# Patient Record
Sex: MALE | Race: WHITE | NOT HISPANIC OR LATINO
[De-identification: names, ages, dates, MRNs, and addresses within clinical notes are randomized per-mention and may not be internally consistent; named-entity substitution may affect disease eponyms.]

---

## 2017-01-07 ENCOUNTER — APPOINTMENT (OUTPATIENT)
Dept: OTOLARYNGOLOGY | Facility: CLINIC | Age: 67
End: 2017-01-07

## 2017-01-07 VITALS
WEIGHT: 160 LBS | HEIGHT: 64 IN | BODY MASS INDEX: 27.31 KG/M2 | HEART RATE: 69 BPM | DIASTOLIC BLOOD PRESSURE: 84 MMHG | SYSTOLIC BLOOD PRESSURE: 128 MMHG

## 2017-01-07 DIAGNOSIS — R90.89 OTHER ABNORMAL FINDINGS ON DIAGNOSTIC IMAGING OF CENTRAL NERVOUS SYSTEM: ICD-10-CM

## 2017-01-07 DIAGNOSIS — H90.41 SENSORINEURAL HEARING LOSS, UNILATERAL, RIGHT EAR, WITH UNRESTRICTED HEARING ON THE CONTRALATERAL SIDE: ICD-10-CM

## 2017-01-08 PROBLEM — R90.89 ABNORMAL BRAIN MRI: Status: ACTIVE | Noted: 2017-01-08

## 2017-01-08 PROBLEM — H90.41 RIGHT ASYMMETRICAL SNHL: Status: ACTIVE | Noted: 2017-01-08

## 2017-01-08 RX ORDER — METOPROLOL SUCCINATE 25 MG/1
25 TABLET, EXTENDED RELEASE ORAL
Qty: 90 | Refills: 0 | Status: ACTIVE | COMMUNITY
Start: 2015-12-07

## 2017-01-08 RX ORDER — ZONISAMIDE 100 MG/1
100 CAPSULE ORAL
Qty: 90 | Refills: 0 | Status: ACTIVE | COMMUNITY
Start: 2016-08-11

## 2017-01-08 RX ORDER — CLOPIDOGREL BISULFATE 75 MG/1
75 TABLET, FILM COATED ORAL
Qty: 90 | Refills: 0 | Status: ACTIVE | COMMUNITY
Start: 2015-11-20

## 2017-01-08 RX ORDER — ROSUVASTATIN CALCIUM 5 MG/1
5 TABLET, FILM COATED ORAL
Qty: 30 | Refills: 0 | Status: ACTIVE | COMMUNITY
Start: 2016-03-20

## 2017-01-08 RX ORDER — FENOFIBRATE 145 MG/1
145 TABLET, COATED ORAL
Qty: 90 | Refills: 0 | Status: ACTIVE | COMMUNITY
Start: 2016-11-04

## 2024-10-11 ENCOUNTER — EMERGENCY (EMERGENCY)
Facility: HOSPITAL | Age: 74
LOS: 1 days | Discharge: HOME | End: 2024-10-11
Attending: STUDENT IN AN ORGANIZED HEALTH CARE EDUCATION/TRAINING PROGRAM | Admitting: STUDENT IN AN ORGANIZED HEALTH CARE EDUCATION/TRAINING PROGRAM
Payer: MEDICARE

## 2024-10-11 VITALS
HEART RATE: 76 BPM | TEMPERATURE: 98 F | WEIGHT: 149.91 LBS | RESPIRATION RATE: 19 BRPM | OXYGEN SATURATION: 99 % | DIASTOLIC BLOOD PRESSURE: 77 MMHG | SYSTOLIC BLOOD PRESSURE: 122 MMHG

## 2024-10-11 DIAGNOSIS — Z95.1 PRESENCE OF AORTOCORONARY BYPASS GRAFT: Chronic | ICD-10-CM

## 2024-10-11 DIAGNOSIS — K41.90 UNILATERAL FEMORAL HERNIA, WITHOUT OBSTRUCTION OR GANGRENE, NOT SPECIFIED AS RECURRENT: Chronic | ICD-10-CM

## 2024-10-11 PROCEDURE — 93010 ELECTROCARDIOGRAM REPORT: CPT

## 2024-10-11 PROCEDURE — 99285 EMERGENCY DEPT VISIT HI MDM: CPT | Mod: GC

## 2024-10-11 RX ORDER — SODIUM CHLORIDE 0.9 % (FLUSH) 0.9 %
1000 SYRINGE (ML) INJECTION ONCE
Refills: 0 | Status: COMPLETED | OUTPATIENT
Start: 2024-10-11 | End: 2024-10-11

## 2024-10-11 NOTE — ED PROVIDER NOTE - OBJECTIVE STATEMENT
74-year-old male, past medical history of CAD status post 1 stent, hypertension, hyperlipidemia, presents to ED complaining of near syncopal episode.  Per patient, had gotten out of bed to use the bathroom when he suddenly started to have lightheadedness and chills and felt very faint; patient then lowered himself to the floor.  No actual LOC.  No head strike.  Patient on Plavix, but no other anticoagulation.  Per wife, patient has had multiple syncopes in the last few years, usually with the same story of getting out of bed and using the bathroom and suddenly feeling faint.  Because of this, patient's cardiologist had DC'd all of his blood pressure medications.  Apparently patient also had single episode of nonbloody nonbilious emesis tonight.  Per wife, patient did not hydrate well today.  Patient denies headache, vision changes, chest pain/shortness of breath, abdominal pain, nausea/vomiting/diarrhea, urinary symptoms, lightheadedness/dizziness, weakness/numbness/tingling, rashes or any other symptoms at this time.  Patient's currently states he feels fatigued, but is otherwise back to baseline.

## 2024-10-11 NOTE — ED ADULT TRIAGE NOTE - CHIEF COMPLAINT QUOTE
pt c/o syncopal episode, lowered self to floor. Denies chets pain, sob Hx CAD x1 stent 2022, HTN, HLD on plavix.

## 2024-10-11 NOTE — ED PROVIDER NOTE - NSICDXPASTMEDICALHX_GEN_ALL_CORE_FT
PAST MEDICAL HISTORY:  CAD (coronary artery disease)     Hyperlipidemia     Hypertension     Partial seizures

## 2024-10-11 NOTE — ED PROVIDER NOTE - PATIENT PORTAL LINK FT
You can access the FollowMyHealth Patient Portal offered by [facility] by registering at the following website: [email or web address]. By joining [employer]’s FollowMyHealth portal, you will also be able to view your health information using other applications (apps) compatible with our system.

## 2024-10-11 NOTE — ED PROVIDER NOTE - PROGRESS NOTE DETAILS
Antonino PGY-2: patient noted to have an elevated white count. in speaking with the wife, the patient is currently on abx for a dental infection, which is likely source of elevated WBCs. patient is clinically feeling better, ambulatory in ED without difficulty. will d/c with PCP f/u and strict return precautions.

## 2024-10-11 NOTE — ED PROVIDER NOTE - NSFOLLOWUPINSTRUCTIONS_ED_ALL_ED_FT
Syncope    Syncope is when you temporarily lose consciousness, also called fainting or passing out. It is caused by a sudden decrease in blood flow to the brain. Even though most causes of syncope are not dangerous, syncope can possibly be a sign of a serious medical problem. Signs that you may be about to faint include feeling dizzy, lightheaded, nausea, visual changes, or cold/clammy skin. Do not drive, operate heavy machinery, or play sports until your health care provider says it is okay.    SEEK IMMEDIATE MEDICAL CARE IF YOU HAVE ANY OF THE FOLLOWING SYMPTOMS: severe headache, pain in your chest/abdomen/back, bleeding from your mouth or rectum, palpitations, shortness of breath, pain with breathing, seizure, confusion, or trouble walking. Today you were seen in the emergency department for evaluation of your symptoms.     Syncope is when you temporarily lose consciousness, also called fainting or passing out. It is caused by a sudden decrease in blood flow to the brain. Even though most causes of syncope are not dangerous, syncope can possibly be a sign of a serious medical problem. Signs that you may be about to faint include feeling dizzy, lightheaded, nausea, visual changes, or cold/clammy skin. Do not drive, operate heavy machinery, or play sports until your health care provider says it is okay.    A copy of your blood work is below. Please bring these to any follow up appointments that you attend.     SEEK IMMEDIATE MEDICAL CARE IF YOU HAVE ANY OF THE FOLLOWING SYMPTOMS: severe headache, pain in your chest/abdomen/back, bleeding from your mouth or rectum, palpitations, shortness of breath, pain with breathing, seizure, confusion, or trouble walking.

## 2024-10-11 NOTE — ED PROVIDER NOTE - CLINICAL SUMMARY MEDICAL DECISION MAKING FREE TEXT BOX
74-year-old male, past medical history of CAD status post 1 stent, hypertension, hyperlipidemia, presents to ED complaining of near syncopal episode. Patient has had multiple similar episodes, thought to be vasovagal/orthostatic in nature. Patient denies fevers, HA, CP/SOB, dizziness, weakness/numbness.    VSS. EKG is NSR w/ non-specific TWIs in anterolateral leads (no prior available). Physical/Neuro exam unremarkable.    DDx includes vasovagal/orthostatic syncope. Low suspicion for cardiogenic etiology. ACS vs. metabolic derangement is also in differential. Low suspicion for infectious at this time.    Plan to check basic labs/electrolytes, troponin, and orthostatic VS. Will provide IVF. Dispo pending results and reassessment.

## 2024-10-11 NOTE — ED PROVIDER NOTE - PHYSICAL EXAMINATION
PHYSICAL EXAM:  GENERAL: non-toxic appearing; in no respiratory distress  HEENT: Atraumatic, Normocephalic, PERRL, EOMs intact b/l w/out deficits, no conjunctival pallor, MMM  NECK: No JVD; FROM  CHEST/LUNG: CTAB no wheezes/rhonchi/rales  HEART: RRR no murmur/gallops/rubs  ABDOMEN: +BS, soft, NT, ND  EXTREMITIES: No LE edema, +2 radial pulses b/l, +2 DP/PT pulses b/l  MUSCULOSKELETAL: FROM of all 4 extremities  NERVOUS SYSTEM:  A&Ox3, No motor deficits or sensory deficits; CNII-XII intact; nml finger to nose; no pronator drift; neg rhomberg; gait intact; no ataxia; no focal neurologic deficits  SKIN:  No new rashes

## 2024-10-12 VITALS — TEMPERATURE: 98 F | RESPIRATION RATE: 18 BRPM | OXYGEN SATURATION: 98 %

## 2024-10-12 LAB
ALBUMIN SERPL ELPH-MCNC: 3.9 G/DL — SIGNIFICANT CHANGE UP (ref 3.3–5)
ALP SERPL-CCNC: 61 U/L — SIGNIFICANT CHANGE UP (ref 40–120)
ALT FLD-CCNC: 9 U/L — SIGNIFICANT CHANGE UP (ref 4–41)
ANION GAP SERPL CALC-SCNC: 13 MMOL/L — SIGNIFICANT CHANGE UP (ref 7–14)
APPEARANCE UR: CLEAR — SIGNIFICANT CHANGE UP
AST SERPL-CCNC: 17 U/L — SIGNIFICANT CHANGE UP (ref 4–40)
BACTERIA # UR AUTO: NEGATIVE /HPF — SIGNIFICANT CHANGE UP
BASOPHILS # BLD AUTO: 0.03 K/UL — SIGNIFICANT CHANGE UP (ref 0–0.2)
BASOPHILS NFR BLD AUTO: 0.2 % — SIGNIFICANT CHANGE UP (ref 0–2)
BILIRUB SERPL-MCNC: 0.5 MG/DL — SIGNIFICANT CHANGE UP (ref 0.2–1.2)
BILIRUB UR-MCNC: NEGATIVE — SIGNIFICANT CHANGE UP
BUN SERPL-MCNC: 21 MG/DL — SIGNIFICANT CHANGE UP (ref 7–23)
CALCIUM SERPL-MCNC: 8.7 MG/DL — SIGNIFICANT CHANGE UP (ref 8.4–10.5)
CAST: 0 /LPF — SIGNIFICANT CHANGE UP (ref 0–4)
CHLORIDE SERPL-SCNC: 104 MMOL/L — SIGNIFICANT CHANGE UP (ref 98–107)
CO2 SERPL-SCNC: 24 MMOL/L — SIGNIFICANT CHANGE UP (ref 22–31)
COLOR SPEC: YELLOW — SIGNIFICANT CHANGE UP
CREAT SERPL-MCNC: 1.61 MG/DL — HIGH (ref 0.5–1.3)
DIFF PNL FLD: NEGATIVE — SIGNIFICANT CHANGE UP
EGFR: 44 ML/MIN/1.73M2 — LOW
EOSINOPHIL # BLD AUTO: 0.07 K/UL — SIGNIFICANT CHANGE UP (ref 0–0.5)
EOSINOPHIL NFR BLD AUTO: 0.4 % — SIGNIFICANT CHANGE UP (ref 0–6)
FLUAV AG NPH QL: SIGNIFICANT CHANGE UP
FLUBV AG NPH QL: SIGNIFICANT CHANGE UP
GLUCOSE SERPL-MCNC: 201 MG/DL — HIGH (ref 70–99)
GLUCOSE UR QL: NEGATIVE MG/DL — SIGNIFICANT CHANGE UP
HCT VFR BLD CALC: 37.7 % — LOW (ref 39–50)
HGB BLD-MCNC: 12 G/DL — LOW (ref 13–17)
IANC: 15.73 K/UL — HIGH (ref 1.8–7.4)
IMM GRANULOCYTES NFR BLD AUTO: 0.6 % — SIGNIFICANT CHANGE UP (ref 0–0.9)
KETONES UR-MCNC: NEGATIVE MG/DL — SIGNIFICANT CHANGE UP
LEUKOCYTE ESTERASE UR-ACNC: NEGATIVE — SIGNIFICANT CHANGE UP
LYMPHOCYTES # BLD AUTO: 0.89 K/UL — LOW (ref 1–3.3)
LYMPHOCYTES # BLD AUTO: 5 % — LOW (ref 13–44)
MAGNESIUM SERPL-MCNC: 1.1 MG/DL — LOW (ref 1.6–2.6)
MCHC RBC-ENTMCNC: 28.4 PG — SIGNIFICANT CHANGE UP (ref 27–34)
MCHC RBC-ENTMCNC: 31.8 GM/DL — LOW (ref 32–36)
MCV RBC AUTO: 89.1 FL — SIGNIFICANT CHANGE UP (ref 80–100)
MONOCYTES # BLD AUTO: 0.85 K/UL — SIGNIFICANT CHANGE UP (ref 0–0.9)
MONOCYTES NFR BLD AUTO: 4.8 % — SIGNIFICANT CHANGE UP (ref 2–14)
NEUTROPHILS # BLD AUTO: 15.73 K/UL — HIGH (ref 1.8–7.4)
NEUTROPHILS NFR BLD AUTO: 89 % — HIGH (ref 43–77)
NITRITE UR-MCNC: NEGATIVE — SIGNIFICANT CHANGE UP
NRBC # BLD: 0 /100 WBCS — SIGNIFICANT CHANGE UP (ref 0–0)
NRBC # FLD: 0 K/UL — SIGNIFICANT CHANGE UP (ref 0–0)
PH UR: 7 — SIGNIFICANT CHANGE UP (ref 5–8)
PLATELET # BLD AUTO: 272 K/UL — SIGNIFICANT CHANGE UP (ref 150–400)
POTASSIUM SERPL-MCNC: 3.6 MMOL/L — SIGNIFICANT CHANGE UP (ref 3.5–5.3)
POTASSIUM SERPL-SCNC: 3.6 MMOL/L — SIGNIFICANT CHANGE UP (ref 3.5–5.3)
PROT SERPL-MCNC: 6.3 G/DL — SIGNIFICANT CHANGE UP (ref 6–8.3)
PROT UR-MCNC: NEGATIVE MG/DL — SIGNIFICANT CHANGE UP
RBC # BLD: 4.23 M/UL — SIGNIFICANT CHANGE UP (ref 4.2–5.8)
RBC # FLD: 13.4 % — SIGNIFICANT CHANGE UP (ref 10.3–14.5)
RBC CASTS # UR COMP ASSIST: 0 /HPF — SIGNIFICANT CHANGE UP (ref 0–4)
RSV RNA NPH QL NAA+NON-PROBE: SIGNIFICANT CHANGE UP
SARS-COV-2 RNA SPEC QL NAA+PROBE: SIGNIFICANT CHANGE UP
SODIUM SERPL-SCNC: 141 MMOL/L — SIGNIFICANT CHANGE UP (ref 135–145)
SP GR SPEC: 1.01 — SIGNIFICANT CHANGE UP (ref 1–1.03)
SQUAMOUS # UR AUTO: 0 /HPF — SIGNIFICANT CHANGE UP (ref 0–5)
TROPONIN T, HIGH SENSITIVITY RESULT: 14 NG/L — SIGNIFICANT CHANGE UP
UROBILINOGEN FLD QL: 1 MG/DL — SIGNIFICANT CHANGE UP (ref 0.2–1)
WBC # BLD: 17.67 K/UL — HIGH (ref 3.8–10.5)
WBC # FLD AUTO: 17.67 K/UL — HIGH (ref 3.8–10.5)
WBC UR QL: 0 /HPF — SIGNIFICANT CHANGE UP (ref 0–5)

## 2024-10-12 PROCEDURE — 71046 X-RAY EXAM CHEST 2 VIEWS: CPT | Mod: 26

## 2024-10-12 RX ORDER — MAGNESIUM SULFATE 500 MG/ML
1 VIAL (ML) INJECTION ONCE
Refills: 0 | Status: DISCONTINUED | OUTPATIENT
Start: 2024-10-12 | End: 2024-10-15

## 2024-10-12 RX ADMIN — Medication 1000 MILLILITER(S): at 00:12

## 2024-10-12 NOTE — ED ADULT NURSE NOTE - OBJECTIVE STATEMENT
Pt received to spot 29, A&Ox4 and ambulatory, c/o near syncopal episode. Hx  CAD s/p x1 stent, HTN, HLD. Pt reports he got out of bed around 8:30 pm to use the restroom and began feeling lightheaded, having chills, and felt faint so he lowered himself to the floor. States he did not fall or hit his head, did not lose consciousness. Pt's wife reports this has happened several times throughout the years, normally when the patient gets out of bed at night to use the bathroom. Denies chest pain, SOB, nausea, vomiting, diarrhea, abdominal pain, dizziness, headache, weakness, numbness/tingling. NSR in 70s on cardiac monitor. 20G IV placed to right AC, labs drawn and sent. Medicated as ordered. Pending imaging.

## 2024-10-12 NOTE — ED ADULT NURSE REASSESSMENT NOTE - NS ED NURSE REASSESS COMMENT FT1
Break RN: Pt is A&Ox4, resting in stretcher with no complaints at this time. Respirations even and unlabored, chest rise equal b/l. NSR noted on cardiac monitor. VS as noted in flow sheets. Orthostatic BP documented. Pt denies chest pain, SOB, cough, chills, abdominal pain, N/V/D, h/a, dizziness, numbness/tingling or any urinary symptoms at this time. 20g IVL placed in LAC. Labs collected and sent. Fluids administered as ordered, see MAR. No acute distress noted. Safety maintained throughout.

## 2024-10-13 LAB
CULTURE RESULTS: SIGNIFICANT CHANGE UP
SPECIMEN SOURCE: SIGNIFICANT CHANGE UP

## 2025-04-04 ENCOUNTER — TRANSCRIPTION ENCOUNTER (OUTPATIENT)
Age: 75
End: 2025-04-04

## 2025-04-04 ENCOUNTER — INPATIENT (INPATIENT)
Facility: HOSPITAL | Age: 75
LOS: 5 days | Discharge: HOME | End: 2025-04-10
Attending: SURGERY | Admitting: SURGERY
Payer: MEDICARE

## 2025-04-04 VITALS
HEART RATE: 72 BPM | HEIGHT: 64 IN | RESPIRATION RATE: 16 BRPM | OXYGEN SATURATION: 97 % | WEIGHT: 240.08 LBS | SYSTOLIC BLOOD PRESSURE: 128 MMHG | TEMPERATURE: 99 F | DIASTOLIC BLOOD PRESSURE: 67 MMHG

## 2025-04-04 DIAGNOSIS — K41.90 UNILATERAL FEMORAL HERNIA, WITHOUT OBSTRUCTION OR GANGRENE, NOT SPECIFIED AS RECURRENT: Chronic | ICD-10-CM

## 2025-04-04 DIAGNOSIS — Z95.1 PRESENCE OF AORTOCORONARY BYPASS GRAFT: Chronic | ICD-10-CM

## 2025-04-04 LAB
ALBUMIN SERPL ELPH-MCNC: 3.6 G/DL — SIGNIFICANT CHANGE UP (ref 3.3–5)
ALP SERPL-CCNC: 69 U/L — SIGNIFICANT CHANGE UP (ref 40–120)
ALT FLD-CCNC: 17 U/L — SIGNIFICANT CHANGE UP (ref 4–41)
ANION GAP SERPL CALC-SCNC: 22 MMOL/L — HIGH (ref 7–14)
ANISOCYTOSIS BLD QL: SLIGHT — SIGNIFICANT CHANGE UP
APPEARANCE UR: ABNORMAL
APTT BLD: 28.2 SEC — SIGNIFICANT CHANGE UP (ref 24.5–35.6)
AST SERPL-CCNC: 20 U/L — SIGNIFICANT CHANGE UP (ref 4–40)
BACTERIA # UR AUTO: NEGATIVE /HPF — SIGNIFICANT CHANGE UP
BASOPHILS # BLD AUTO: 0 K/UL — SIGNIFICANT CHANGE UP (ref 0–0.2)
BASOPHILS NFR BLD AUTO: 0 % — SIGNIFICANT CHANGE UP (ref 0–2)
BILIRUB SERPL-MCNC: 1.6 MG/DL — HIGH (ref 0.2–1.2)
BILIRUB UR-MCNC: ABNORMAL
BLD GP AB SCN SERPL QL: NEGATIVE — SIGNIFICANT CHANGE UP
BLOOD GAS VENOUS COMPREHENSIVE RESULT: SIGNIFICANT CHANGE UP
BUN SERPL-MCNC: 23 MG/DL — SIGNIFICANT CHANGE UP (ref 7–23)
BURR CELLS BLD QL SMEAR: PRESENT — SIGNIFICANT CHANGE UP
CALCIUM SERPL-MCNC: 9.9 MG/DL — SIGNIFICANT CHANGE UP (ref 8.4–10.5)
CAST: 5 /LPF — HIGH (ref 0–4)
CHLORIDE SERPL-SCNC: 90 MMOL/L — LOW (ref 98–107)
CO2 SERPL-SCNC: 18 MMOL/L — LOW (ref 22–31)
COLOR SPEC: ABNORMAL
CREAT SERPL-MCNC: 1.87 MG/DL — HIGH (ref 0.5–1.3)
DACRYOCYTES BLD QL SMEAR: SLIGHT — SIGNIFICANT CHANGE UP
DIFF PNL FLD: NEGATIVE — SIGNIFICANT CHANGE UP
EGFR: 37 ML/MIN/1.73M2 — LOW
EGFR: 37 ML/MIN/1.73M2 — LOW
EOSINOPHIL # BLD AUTO: 0 K/UL — SIGNIFICANT CHANGE UP (ref 0–0.5)
EOSINOPHIL NFR BLD AUTO: 0 % — SIGNIFICANT CHANGE UP (ref 0–6)
FINE GRAN CASTS #/AREA URNS AUTO: PRESENT
GIANT PLATELETS BLD QL SMEAR: PRESENT — SIGNIFICANT CHANGE UP
GLUCOSE SERPL-MCNC: 197 MG/DL — HIGH (ref 70–99)
GLUCOSE UR QL: NEGATIVE MG/DL — SIGNIFICANT CHANGE UP
HCT VFR BLD CALC: 38.1 % — LOW (ref 39–50)
HGB BLD-MCNC: 11.5 G/DL — LOW (ref 13–17)
HYALINE CASTS # UR AUTO: PRESENT
IANC: 34.63 K/UL — HIGH (ref 1.8–7.4)
INR BLD: 1.44 RATIO — HIGH (ref 0.85–1.16)
KETONES UR-MCNC: NEGATIVE MG/DL — SIGNIFICANT CHANGE UP
LEUKOCYTE ESTERASE UR-ACNC: NEGATIVE — SIGNIFICANT CHANGE UP
LIDOCAIN IGE QN: 28 U/L — SIGNIFICANT CHANGE UP (ref 7–60)
LYMPHOCYTES # BLD AUTO: 1.02 K/UL — SIGNIFICANT CHANGE UP (ref 1–3.3)
LYMPHOCYTES # BLD AUTO: 2.6 % — LOW (ref 13–44)
MANUAL SMEAR VERIFICATION: SIGNIFICANT CHANGE UP
MCHC RBC-ENTMCNC: 23.3 PG — LOW (ref 27–34)
MCHC RBC-ENTMCNC: 30.2 G/DL — LOW (ref 32–36)
MCV RBC AUTO: 77.1 FL — LOW (ref 80–100)
MICROCYTES BLD QL: SLIGHT — SIGNIFICANT CHANGE UP
MONOCYTES # BLD AUTO: 3.76 K/UL — HIGH (ref 0–0.9)
MONOCYTES NFR BLD AUTO: 9.6 % — SIGNIFICANT CHANGE UP (ref 2–14)
NEUTROPHILS # BLD AUTO: 34.37 K/UL — HIGH (ref 1.8–7.4)
NEUTROPHILS NFR BLD AUTO: 86.9 % — HIGH (ref 43–77)
NEUTS BAND # BLD: 0.9 % — SIGNIFICANT CHANGE UP (ref 0–6)
NEUTS BAND NFR BLD: 0.9 % — SIGNIFICANT CHANGE UP (ref 0–6)
NITRITE UR-MCNC: NEGATIVE — SIGNIFICANT CHANGE UP
OVALOCYTES BLD QL SMEAR: SIGNIFICANT CHANGE UP
PH UR: 5.5 — SIGNIFICANT CHANGE UP (ref 5–8)
PLAT MORPH BLD: ABNORMAL
PLATELET # BLD AUTO: 438 K/UL — HIGH (ref 150–400)
PLATELET COUNT - ESTIMATE: NORMAL — SIGNIFICANT CHANGE UP
POIKILOCYTOSIS BLD QL AUTO: SIGNIFICANT CHANGE UP
POLYCHROMASIA BLD QL SMEAR: SIGNIFICANT CHANGE UP
POTASSIUM SERPL-MCNC: 3.2 MMOL/L — LOW (ref 3.5–5.3)
POTASSIUM SERPL-SCNC: 3.2 MMOL/L — LOW (ref 3.5–5.3)
PROT SERPL-MCNC: 6.9 G/DL — SIGNIFICANT CHANGE UP (ref 6–8.3)
PROT UR-MCNC: 30 MG/DL
PROTHROM AB SERPL-ACNC: 17.1 SEC — HIGH (ref 9.9–13.4)
RBC # BLD: 4.94 M/UL — SIGNIFICANT CHANGE UP (ref 4.2–5.8)
RBC # FLD: 17 % — HIGH (ref 10.3–14.5)
RBC BLD AUTO: ABNORMAL
RBC CASTS # UR COMP ASSIST: 1 /HPF — SIGNIFICANT CHANGE UP (ref 0–4)
REVIEW: SIGNIFICANT CHANGE UP
RH IG SCN BLD-IMP: POSITIVE — SIGNIFICANT CHANGE UP
SODIUM SERPL-SCNC: 130 MMOL/L — LOW (ref 135–145)
SP GR SPEC: 1.02 — SIGNIFICANT CHANGE UP (ref 1–1.03)
SQUAMOUS # UR AUTO: 6 /HPF — HIGH (ref 0–5)
UROBILINOGEN FLD QL: 1 MG/DL — SIGNIFICANT CHANGE UP (ref 0.2–1)
WBC # BLD: 39.15 K/UL — HIGH (ref 3.8–10.5)
WBC # FLD AUTO: 39.15 K/UL — HIGH (ref 3.8–10.5)
WBC UR QL: 2 /HPF — SIGNIFICANT CHANGE UP (ref 0–5)

## 2025-04-04 PROCEDURE — 88309 TISSUE EXAM BY PATHOLOGIST: CPT | Mod: 26

## 2025-04-04 PROCEDURE — 99222 1ST HOSP IP/OBS MODERATE 55: CPT

## 2025-04-04 PROCEDURE — 44140 PARTIAL REMOVAL OF COLON: CPT

## 2025-04-04 PROCEDURE — 88341 IMHCHEM/IMCYTCHM EA ADD ANTB: CPT | Mod: 26

## 2025-04-04 PROCEDURE — 99223 1ST HOSP IP/OBS HIGH 75: CPT | Mod: 57

## 2025-04-04 PROCEDURE — 74019 RADEX ABDOMEN 2 VIEWS: CPT | Mod: 26

## 2025-04-04 PROCEDURE — 88307 TISSUE EXAM BY PATHOLOGIST: CPT | Mod: 26

## 2025-04-04 PROCEDURE — 74176 CT ABD & PELVIS W/O CONTRAST: CPT | Mod: 26

## 2025-04-04 PROCEDURE — 88313 SPECIAL STAINS GROUP 2: CPT | Mod: 26

## 2025-04-04 PROCEDURE — 71045 X-RAY EXAM CHEST 1 VIEW: CPT | Mod: 26

## 2025-04-04 PROCEDURE — 99285 EMERGENCY DEPT VISIT HI MDM: CPT | Mod: GC

## 2025-04-04 PROCEDURE — 88342 IMHCHEM/IMCYTCHM 1ST ANTB: CPT | Mod: 26

## 2025-04-04 DEVICE — STAPLER COVIDIEN PURSTRING 65MM
Type: IMPLANTABLE DEVICE | Site: RIGHT | Status: NON-FUNCTIONAL
Removed: 2025-04-04

## 2025-04-04 DEVICE — STAPLER COVIDIEN TA 90 BLUE
Type: IMPLANTABLE DEVICE | Site: RIGHT | Status: NON-FUNCTIONAL
Removed: 2025-04-04

## 2025-04-04 DEVICE — STAPLER COVIDIEN GIA 80-3.0MM PURPLE RELOAD
Type: IMPLANTABLE DEVICE | Site: RIGHT | Status: NON-FUNCTIONAL
Removed: 2025-04-04

## 2025-04-04 DEVICE — STAPLER COVIDIEN GIA 80-3.0MM PURPLE
Type: IMPLANTABLE DEVICE | Site: RIGHT | Status: NON-FUNCTIONAL
Removed: 2025-04-04

## 2025-04-04 DEVICE — LIGATING CLIPS WECK HORIZON LARGE (ORANGE) 24
Type: IMPLANTABLE DEVICE | Site: RIGHT | Status: NON-FUNCTIONAL
Removed: 2025-04-04

## 2025-04-04 DEVICE — SURGICEL 2 X 14"
Type: IMPLANTABLE DEVICE | Site: RIGHT | Status: NON-FUNCTIONAL
Removed: 2025-04-04

## 2025-04-04 RX ORDER — HYDROMORPHONE/SOD CHLOR,ISO/PF 2 MG/10 ML
0.5 SYRINGE (ML) INJECTION
Refills: 0 | Status: DISCONTINUED | OUTPATIENT
Start: 2025-04-04 | End: 2025-04-04

## 2025-04-04 RX ORDER — ACETAMINOPHEN 500 MG/5ML
1000 LIQUID (ML) ORAL EVERY 6 HOURS
Refills: 0 | Status: COMPLETED | OUTPATIENT
Start: 2025-04-04 | End: 2025-04-05

## 2025-04-04 RX ORDER — MINERAL OIL
133 OIL (ML) MISCELLANEOUS ONCE
Refills: 0 | Status: COMPLETED | OUTPATIENT
Start: 2025-04-04 | End: 2025-04-04

## 2025-04-04 RX ORDER — OXYCODONE HYDROCHLORIDE 30 MG/1
2.5 TABLET ORAL EVERY 4 HOURS
Refills: 0 | Status: DISCONTINUED | OUTPATIENT
Start: 2025-04-04 | End: 2025-04-05

## 2025-04-04 RX ORDER — SODIUM CHLORIDE 9 G/1000ML
1000 INJECTION, SOLUTION INTRAVENOUS
Refills: 0 | Status: DISCONTINUED | OUTPATIENT
Start: 2025-04-04 | End: 2025-04-05

## 2025-04-04 RX ORDER — ONDANSETRON HCL/PF 4 MG/2 ML
4 VIAL (ML) INJECTION ONCE
Refills: 0 | Status: DISCONTINUED | OUTPATIENT
Start: 2025-04-04 | End: 2025-04-04

## 2025-04-04 RX ORDER — ZONISAMIDE 25 MG
1 CAPSULE ORAL
Refills: 0 | DISCHARGE

## 2025-04-04 RX ORDER — ISOSORBIDE MONONITRATE 60 MG/1
1 TABLET, EXTENDED RELEASE ORAL
Refills: 0 | DISCHARGE

## 2025-04-04 RX ORDER — OXYCODONE HYDROCHLORIDE 30 MG/1
5 TABLET ORAL EVERY 4 HOURS
Refills: 0 | Status: DISCONTINUED | OUTPATIENT
Start: 2025-04-04 | End: 2025-04-05

## 2025-04-04 RX ORDER — PIPERACILLIN-TAZO-DEXTROSE,ISO 3.375G/5
3.38 IV SOLUTION, PIGGYBACK PREMIX FROZEN(ML) INTRAVENOUS ONCE
Refills: 0 | Status: COMPLETED | OUTPATIENT
Start: 2025-04-04 | End: 2025-04-04

## 2025-04-04 RX ORDER — PIPERACILLIN-TAZO-DEXTROSE,ISO 3.375G/5
3.38 IV SOLUTION, PIGGYBACK PREMIX FROZEN(ML) INTRAVENOUS EVERY 12 HOURS
Refills: 0 | Status: DISCONTINUED | OUTPATIENT
Start: 2025-04-04 | End: 2025-04-09

## 2025-04-04 RX ORDER — LACOSAMIDE 150 MG/1
100 TABLET, FILM COATED ORAL EVERY 12 HOURS
Refills: 0 | Status: DISCONTINUED | OUTPATIENT
Start: 2025-04-04 | End: 2025-04-06

## 2025-04-04 RX ORDER — ACETAMINOPHEN 500 MG/5ML
1000 LIQUID (ML) ORAL ONCE
Refills: 0 | Status: COMPLETED | OUTPATIENT
Start: 2025-04-04 | End: 2025-04-04

## 2025-04-04 RX ORDER — PIPERACILLIN-TAZO-DEXTROSE,ISO 3.375G/5
3.38 IV SOLUTION, PIGGYBACK PREMIX FROZEN(ML) INTRAVENOUS EVERY 8 HOURS
Refills: 0 | Status: DISCONTINUED | OUTPATIENT
Start: 2025-04-04 | End: 2025-04-04

## 2025-04-04 RX ORDER — HYDROMORPHONE/SOD CHLOR,ISO/PF 2 MG/10 ML
0.3 SYRINGE (ML) INJECTION
Refills: 0 | Status: DISCONTINUED | OUTPATIENT
Start: 2025-04-04 | End: 2025-04-04

## 2025-04-04 RX ORDER — ENOXAPARIN SODIUM 100 MG/ML
40 INJECTION SUBCUTANEOUS EVERY 24 HOURS
Refills: 0 | Status: DISCONTINUED | OUTPATIENT
Start: 2025-04-04 | End: 2025-04-04

## 2025-04-04 RX ORDER — IOHEXOL 350 MG/ML
30 INJECTION, SOLUTION INTRAVENOUS ONCE
Refills: 0 | Status: COMPLETED | OUTPATIENT
Start: 2025-04-04 | End: 2025-04-04

## 2025-04-04 RX ORDER — HEPARIN SODIUM 1000 [USP'U]/ML
5000 INJECTION INTRAVENOUS; SUBCUTANEOUS EVERY 8 HOURS
Refills: 0 | Status: DISCONTINUED | OUTPATIENT
Start: 2025-04-04 | End: 2025-04-10

## 2025-04-04 RX ORDER — ATORVASTATIN CALCIUM 80 MG/1
1 TABLET, FILM COATED ORAL
Refills: 0 | DISCHARGE

## 2025-04-04 RX ORDER — HEPARIN SODIUM 1000 [USP'U]/ML
5000 INJECTION INTRAVENOUS; SUBCUTANEOUS EVERY 8 HOURS
Refills: 0 | Status: DISCONTINUED | OUTPATIENT
Start: 2025-04-04 | End: 2025-04-04

## 2025-04-04 RX ORDER — ONDANSETRON HCL/PF 4 MG/2 ML
4 VIAL (ML) INJECTION ONCE
Refills: 0 | Status: COMPLETED | OUTPATIENT
Start: 2025-04-04 | End: 2025-04-04

## 2025-04-04 RX ORDER — SODIUM CHLORIDE 9 G/1000ML
1000 INJECTION, SOLUTION INTRAVENOUS
Refills: 0 | Status: DISCONTINUED | OUTPATIENT
Start: 2025-04-04 | End: 2025-04-06

## 2025-04-04 RX ADMIN — Medication 1000 MILLIGRAM(S): at 12:00

## 2025-04-04 RX ADMIN — Medication 200 GRAM(S): at 13:21

## 2025-04-04 RX ADMIN — Medication 100 MILLIEQUIVALENT(S): at 15:53

## 2025-04-04 RX ADMIN — Medication 25 GRAM(S): at 21:45

## 2025-04-04 RX ADMIN — SODIUM CHLORIDE 40 MILLILITER(S): 9 INJECTION, SOLUTION INTRAVENOUS at 19:45

## 2025-04-04 RX ADMIN — IOHEXOL 30 MILLILITER(S): 350 INJECTION, SOLUTION INTRAVENOUS at 11:06

## 2025-04-04 RX ADMIN — Medication 400 MILLIGRAM(S): at 10:05

## 2025-04-04 RX ADMIN — Medication 1000 MILLILITER(S): at 13:21

## 2025-04-04 RX ADMIN — Medication 2000 MILLILITER(S): at 14:42

## 2025-04-04 RX ADMIN — SODIUM CHLORIDE 100 MILLILITER(S): 9 INJECTION, SOLUTION INTRAVENOUS at 15:57

## 2025-04-04 RX ADMIN — Medication 4 MILLIGRAM(S): at 10:44

## 2025-04-04 RX ADMIN — Medication 133 MILLILITER(S): at 10:28

## 2025-04-04 RX ADMIN — Medication 100 MILLIEQUIVALENT(S): at 21:45

## 2025-04-04 NOTE — ED ADULT NURSE NOTE - NSFALLUNIVINTERV_ED_ALL_ED
Bed/Stretcher in lowest position, wheels locked, appropriate side rails in place/Call bell, personal items and telephone in reach/Instruct patient to call for assistance before getting out of bed/chair/stretcher/Non-slip footwear applied when patient is off stretcher/[place] to call system/Physically safe environment - no spills, clutter or unnecessary equipment/Purposeful proactive rounding/Room/bathroom lighting operational, light cord in reach

## 2025-04-04 NOTE — ED ADULT TRIAGE NOTE - CHIEF COMPLAINT QUOTE
Pt presents to ED via wheelchair from home with c/o abdominal cramping pain, nausea,  generalized weakness, and constipation x 1 week. Pt has hx of HLD and CABG. Pt family reports pt appears more pale than baseline. Pt noted to have tremor family reports appears worse than baseline.

## 2025-04-04 NOTE — ED PROVIDER NOTE - PROGRESS NOTE DETAILS
[place] - CTAP changed to noncon as concern for low GFR, results pending.  Labs reviewed and noted significant leukocytosis and elevated lactate.  Mesenteric ischemia remains on the differential.  If other etiology not apparent on noncon CT, will order with IV contrast.  Surgery team to consult.  Will give dose of zosyn now and additional IV hydration.

## 2025-04-04 NOTE — ED PROVIDER NOTE - OBJECTIVE STATEMENT
75-year-old male PMH CAD status post CABG on Plavix, hyperlipidemia, hypertension, history of tremors, history of seizures presenting with abdominal pain  Patient has been constipated since last Thursday approximately 8 days.  Patient was seen by PMD given MiraLAX and Colace and advised to come to the emergency department if patient has not defecated in a week.  Patient endorsing decreased p.o. intake due to nausea and patient had emesis x 2 2 days ago and 3 days ago.  Patient emesis was nonbilious nonbloody emesis patient denies any chest pain shortness of breath fevers chills. 75-year-old male PMH CAD status post CABG on Plavix, hyperlipidemia, hypertension, history of tremors, history of seizures presenting with abdominal pain  Patient has been constipated since last Thursday approximately 8 days.  Patient was seen by PMD given MiraLAX and Colace and advised to come to the emergency department if patient has not defecated in a week.  Patient endorsing decreased p.o. intake due to nausea and patient had emesis x 2 2 days ago and 3 days ago.  Patient emesis was nonbilious nonbloody emesis patient denies any chest pain shortness of breath fevers chills.  Attending - Agree with above.  I evaluated patient myself. 76 y/o M with wife at bedside.  Reports hx of constipation since December, which usually improves with miralax and colace.  c/o constipation now.  Last BM 3/27/25.  Using miralax and colace without resolution.  To MD on Wednesday.  To ER now as still no BM.  c/o right sided abd pain x 2 days with distention, nausea and vomiting.  No fever.

## 2025-04-04 NOTE — PACU DISCHARGE NOTE - COMMENTS
Discharge from Anesthesia surveillance once criteria met and documentation of post-anesthesia evaluation complete. No anesthesia complications.

## 2025-04-04 NOTE — ED PROVIDER NOTE - CLINICAL SUMMARY MEDICAL DECISION MAKING FREE TEXT BOX
Patient concerning for SBO patient concerning for bowel obstruction, constipation will obtain CT abdomen pelvis enema basic labs lipase

## 2025-04-04 NOTE — CONSULT NOTE ADULT - NS ATTEND AMEND GEN_ALL_CORE FT
Patient seen and examined. Presenting with obstipation symptoms found to have large bowel obstruction with suspected mass at the hepatic flexure. R colon dilated proximally to ~10cm with pericolonic fat stranding/inflammatory changes. Abd tender R sided and labs with significant leukocytosis of 39. In light of the cecal inflammatory changes, severity of leukocytosis, and abdominal exam, suspicion for early bowel ischemia and/or impending perforation. Given stricture in proximal colon, colonic stenting would be technically challenging and with above findings, high risk for cecal perforation. Would opt for surgical management if feasible.     Total time spent to complete patient's bedside assessment, physical examination, review medical chart including labs & imaging, discuss medical plan of care with housestaff was more than 50 minutes.

## 2025-04-04 NOTE — BRIEF OPERATIVE NOTE - NSICDXBRIEFPOSTOP_GEN_ALL_CORE_FT
POST-OP DIAGNOSIS:  Colonic mass 04-Apr-2025 19:32:49  Dhruv Moseley  
POST-OP DIAGNOSIS:  Colonic mass 04-Apr-2025 19:32:49  Dhruv Moseley

## 2025-04-04 NOTE — ASU PATIENT PROFILE, ADULT - NSCAFFEAMTFREQ_GEN_ALL_CORE_SD

## 2025-04-04 NOTE — ED ADULT TRIAGE NOTE - LOCATION:
Pt called and request a copy of FIT Test results to be mailed to her.  Results were mailed out to pt today by [facility] 05/22/2018
Right arm;

## 2025-04-04 NOTE — ASU PREOP CHECKLIST - SELECT TESTS ORDERED
CBC/CMP/Type and Cross/Type and Screen/POCT Blood Glucose  at 1635/CBC/CMP/Type and Cross/Type and Screen/POCT Blood Glucose

## 2025-04-04 NOTE — ED PROVIDER NOTE - PHYSICAL EXAMINATION
GENERAL: NAD, lying in bed comfortably  HEAD:  Atraumatic, normocephalic  EYES: EOMI, PERRLA, conjunctiva and sclera clear  NECK: Supple, trachea midline, no JVD  HEART: Regular rate and rhythm, no murmurs, rubs, or gallops  LUNGS: Unlabored respirations.  Clear to auscultation bilaterally, no crackles, wheezing, or rhonchi  ABDOMEN: Soft, globally tender, +BS  EXTREMITIES: 2+ peripheral pulses bilaterally. No clubbing, cyanosis, or edema  NERVOUS SYSTEM:  A&Ox3, moving all extremities, no focal deficits   SKIN: No rashes or lesions GENERAL: NAD, lying in bed comfortably  HEAD:  Atraumatic, normocephalic  EYES: EOMI, PERRLA, conjunctiva and sclera clear  NECK: Supple, trachea midline, no JVD  HEART: Regular rate and rhythm, no murmurs, rubs, or gallops  LUNGS: Unlabored respirations.  Clear to auscultation bilaterally, no crackles, wheezing, or rhonchi  ABDOMEN: Soft, globally tender, +BS  EXTREMITIES: 2+ peripheral pulses bilaterally. No clubbing, cyanosis, or edema  NERVOUS SYSTEM:  A&Ox3, moving all extremities, no focal deficits   SKIN: No rashes or lesions  ATTENDING PHYSICAL EXAM  GEN - NAD; well appearing; A+O x3  HEAD - NC/AT; EYES/NOSE - PERRL, EOMI, mucous membranes moist, no discharge; THROAT: Oral cavity and pharynx normal. No inflammation, swelling, exudate, or lesions  NECK: Neck supple, non-tender without lymphadenopathy, no masses, no JVD  PULMONARY - CTA b/l, symmetric breath sounds, no w/r/r  CARDIAC -s1s2, RRR, no M,R,G  ABDOMEN - +distended. +tympanic.  + TTP RUQ and RLQ with guarding.  BACK - no CVA tenderness, No vertebral or paravertebral tenderness  EXTREMITIES - symmetric pulses, 2+ dp, capillary refill < 2 seconds, no clubbing, no cyanosis, no edema

## 2025-04-04 NOTE — BRIEF OPERATIVE NOTE - NSICDXBRIEFPREOP_GEN_ALL_CORE_FT
PRE-OP DIAGNOSIS:  Colonic mass 04-Apr-2025 19:32:41  Dhruv Moseley  
PRE-OP DIAGNOSIS:  Colonic mass 04-Apr-2025 19:32:41  Dhruv Moseley

## 2025-04-04 NOTE — CHART NOTE - NSCHARTNOTEFT_GEN_A_CORE
Author called by primary surgery team requesting a conversion of patient's seizure medications to IV formulation while he is NPO for Large Bowel Obstruction surgery. Called Epilepsy fellow. Last EKG in 10/2024 with AK interval 128.     Would hold zonisamide. Start Lacosamide 100mg IV BID for now and transition patient back to home zonisamide once PO access is resumed. Order EKG to check AK interval. Please call [phone] if AK interval greater than 200.

## 2025-04-04 NOTE — H&P ADULT - ASSESSMENT
75M PMH CAD s/p CABG cheyanne plavix, HLD, HTN, history of seizures, PSH of R inguinal hernia, presents with abdominal pain, nausea, vomiting, found to have an LBO    Plan  - admit to surgery under Dr. Jeffries  - add on to OR emergently for exploratory laparotomy, hemicolectomy, possible ostomy  - NPO/IVF  - NGT placed at bedside. F/u xray  - med rec completed. PO meds currently held  - dvt ppx    Plan discussed with fellow on behalf of attending Dr. Jeffries    Surgery A Team  [id number]

## 2025-04-04 NOTE — CONSULT NOTE ADULT - ASSESSMENT
75M PMH CAD s/p CABG on plavix, HLD, HTN, history of seizures, PSH of R inguinal hernia, presents with worsening constipation that started on 12/2024. Patient states his last BM is on 3/27, last flatus a few days prior. He states that his outpt PMD prescribed him laxatives, but the symptoms did not resolve. CT A/P revealed concern for LBO up to hepatic flexure.     GI was consulted for LBO.    Assessment  #LBO  #Sepsis  #Chronic constipation  #CAD s/p CABG on Plavix  - (+) LBO up to hepatic flexure along with leukocytosis, abdominal tenderness w/ possible peritoneal signs, questionable fever at home (99's in ED) concerning for perforation or high risk of perforation  - Worsening constipation, never had colonoscopy, and suspicion for colonic neoplasm on initial CT A/P  - Last dose of Plavix was on 4/3    Recommendations  - High risk for perforation during colonoscopy given clinical exam, signs and the location of obstruction; Risks >> benefits at this time. Discussed with patient, wife and colorectal fellow.   - OR planning per sx team  - Symptomatic support per primary team  - Agree with abx coverage and infectious workup  - GI team to sign off. Please reconsult as needed.    D/w Dr. Flakita Yanez  GI/Hepatology Fellow    MONDAY-FRIDAY 8AM-5PM:  Pager# [phone] ([facility]) or [phone] ([facility])    NON-URGENT CONSULTS:  Please email [email or web address]  AT NIGHT AND ON WEEKENDS:  Contact on-call GI fellow via [employer] by paging or hospital  from 5pm-8am and on weekends/holidays

## 2025-04-04 NOTE — BRIEF OPERATIVE NOTE - OPERATION/FINDINGS
Exploratory laparotomy and right hemicolectomy for right colon mass at hepatic flexure   Side to side anastamosis for ileo- transverse colon with chandra technique 
Ex lap. Noted large mass at hepatic flexure with impending rupture of cecum and ischemia of wall. Cecum decompressed with mild spillage. Lateral to medial mobilization. Proximal staple line distal ileum. Distal staple line mid transverse colon. Primary anastomosis Cristiano using ABDOULAYE and TA for common channel. Staple line clips and crotch stitch. Fascia closed Figure of Eight maxon #1 sutures. Skin staples. Gallbladder examined, hemostatic without bile spillage.

## 2025-04-04 NOTE — BRIEF OPERATIVE NOTE - NSICDXBRIEFPROCEDURE_GEN_ALL_CORE_FT
PROCEDURES:  Right hemicolectomy 04-Apr-2025 19:32:31  Dhruv Moseley  
PROCEDURES:  Right hemicolectomy 04-Apr-2025 19:32:31  Dhruv Moseley

## 2025-04-04 NOTE — H&P ADULT - NSHPLABSRESULTS_GEN_ALL_CORE
< from: CT Abdomen and Pelvis w/ Oral Cont (04.04.25 @ 12:21) >    FINDINGS:  Evaluation of the solid organs and vasculature is limited without   intravenous contrast.    LOWER CHEST: Subsegmental atelectasis.    LIVER: Unremarkable at unenhanced CT.  BILE DUCTS: Normal caliber.  GALLBLADDER: Within normal limits.  SPLEEN: Within normal limits.  PANCREAS: Within normal limits.  ADRENALS: Within normal limits.  KIDNEYS/URETERS: Atrophic kidneys. Indeterminant 1.4 cm isodense left   upper pole exophytic lesion. No hydronephrosis.    BLADDER: Within normal limits.  REPRODUCTIVE ORGANS: Prostate within normal limits.    BOWEL: Ascending colon is dilated up to 9.8 cm with abrupt transition at   the hepatic flexure where there is short segment luminal narrowing (301,   53), concerning for colonic neoplasm. Pericolonic fat stranding and   fluid. Appendix within normal limits.  PERITONEUM/RETROPERITONEUM: As above.  VESSELS: Atherosclerotic changes.  LYMPH NODES: No lymphadenopathy.  ABDOMINAL WALL: Within normal limits.  BONES: Degenerative changes. Right L5 spondylolysis.    IMPRESSION:  Large bowel obstruction to level of the hepatic flexure, concerning for   colonic neoplasm.    Indeterminant left renal lesion which can be further evaluated on   ultrasound or MRI.    < end of copied text >

## 2025-04-04 NOTE — CONSULT NOTE ADULT - SUBJECTIVE AND OBJECTIVE BOX
HPI:  DOMINGO CAPELLAN is a 75M PMH CAD s/p CABG cheyanne plavix, HLD, HTN, history of seizures, PSH of R inguinal hernia, presents with worsening constipation that started on 12/2024. Patient states his last BM is on 3/27, last flatus a few days prior. He states that his outpt PMD prescribed him laxatives, but the symptoms did not resolve.     Per wife at bedside, patient never had colonoscopy prior. Patient was complaining of abdominal pain and was having worsening tremors (has tremors at baseline, but not as bad compared to yesterday). May have had fever at home, but unclear about the exact reading. No GIB noted. Patient also had nausea and vomiting at home.     PMHX/PSHX:    Hypertension    Hyperlipidemia    CAD (coronary artery disease)    Partial seizures    S/P CABG (coronary artery bypass graft)    Hernia, femoral      Allergies:  No Known Allergies      Home Medications: reviewed  Hospital Medications:  heparin   Injectable 5000 Unit(s) SubCutaneous every 8 hours  lacosamide IVPB 100 milliGRAM(s) IV Intermittent every 12 hours  lactated ringers. 1000 milliLiter(s) IV Continuous <Continuous>  piperacillin/tazobactam IVPB.. 3.375 Gram(s) IV Intermittent every 12 hours  potassium chloride  10 mEq/100 mL IVPB 10 milliEquivalent(s) IV Intermittent every 1 hour        PHYSICAL EXAM:   Vital Signs:  Vital Signs Last 24 Hrs  T(C): 37 (04 Apr 2025 14:36), Max: 37.7 (04 Apr 2025 10:08)  T(F): 98.6 (04 Apr 2025 14:36), Max: 99.9 (04 Apr 2025 10:08)  HR: 112 (04 Apr 2025 14:36) (72 - 112)  BP: 131/68 (04 Apr 2025 14:36) (128/67 - 131/68)  BP(mean): --  RR: 17 (04 Apr 2025 14:36) (16 - 17)  SpO2: 98% (04 Apr 2025 14:36) (97% - 98%)    Parameters below as of 04 Apr 2025 14:36  Patient On (Oxygen Delivery Method): room air      Daily Height in cm: 162.56 (04 Apr 2025 09:19)    Daily     GENERAL: No acute distress at rest  NEURO: alert and answering questions appropriate  HEENT: NCAT, anicteric sclera  CHEST: no respiratory distress, no accessory muscle use  CARDIAC: regular rate, +S1/S2  ABDOMEN: Markedly distended abdomen, especially at R-side. (+) tenderness at R abdomen without rebound; (+) tenderness at L abdomen with rebound tenderness  EXTREMITIES: warm, well perfused  SKIN: no lesions noted    LABS: reviewed                        11.5   39.15 )-----------( 438      ( 04 Apr 2025 10:01 )             38.1     04-04    130[L]  |  90[L]  |  23  ----------------------------<  197[H]  3.2[L]   |  18[L]  |  1.87[H]    Ca    9.9      04 Apr 2025 10:01    TPro  6.9  /  Alb  3.6  /  TBili  1.6[H]  /  DBili  x   /  AST  20  /  ALT  17  /  AlkPhos  69  04-04    LIVER FUNCTIONS - ( 04 Apr 2025 10:01 )  Alb: 3.6 g/dL / Pro: 6.9 g/dL / ALK PHOS: 69 U/L / ALT: 17 U/L / AST: 20 U/L / GGT: x             Urinalysis with Rflx Culture (collected 04 Apr 2025 13:30)      < from: CT Abdomen and Pelvis w/ Oral Cont (04.04.25 @ 12:21) >  FINDINGS:  Evaluation of the solid organs and vasculature is limited without   intravenous contrast.    LOWER CHEST: Subsegmental atelectasis.    LIVER: Unremarkable at unenhanced CT.  BILE DUCTS: Normal caliber.  GALLBLADDER: Within normal limits.  SPLEEN: Within normal limits.  PANCREAS: Within normal limits.  ADRENALS: Within normal limits.  KIDNEYS/URETERS: Atrophic kidneys. Indeterminant 1.4 cm isodense left   upper pole exophytic lesion. No hydronephrosis.    BLADDER: Within normal limits.  REPRODUCTIVE ORGANS: Prostate within normal limits.    BOWEL: Ascending colon is dilated up to 9.8 cm with abrupt transition at   the hepatic flexure where there is short segment luminal narrowing (301,   53), concerning for colonic neoplasm. Pericolonic fat stranding and   fluid. Appendix within normal limits.  PERITONEUM/RETROPERITONEUM: As above.  VESSELS: Atherosclerotic changes.  LYMPH NODES: No lymphadenopathy.  ABDOMINAL WALL: Within normal limits.  BONES: Degenerative changes. Right L5 spondylolysis.    IMPRESSION:  Large bowel obstruction to level of the hepatic flexure, concerning for   colonic neoplasm.    Indeterminant left renal lesion which can be further evaluated on   ultrasound or MRI.    --- End of Report ---    < end of copied text >

## 2025-04-04 NOTE — H&P ADULT - HISTORY OF PRESENT ILLNESS
75M PMH CAD s/p CABG cheyanne plavix, HLD, HTN, history of seizures, PSH of R inguinal hernia, presents with worsening constipation that started on 12/2024. Patient states his last BM is on 3/27, last flatus a few days prior. He states that his outpt PMD prescribed him laxatives, but the symptoms did not resolve.

## 2025-04-04 NOTE — ASU PATIENT PROFILE, ADULT - REASON FOR ADMISSION, PROFILE
colon surgery Azithromycin Counseling:  I discussed with the patient the risks of azithromycin including but not limited to GI upset, allergic reaction, drug rash, diarrhea, and yeast infections.

## 2025-04-04 NOTE — H&P ADULT - NSHPPHYSICALEXAM_GEN_ALL_CORE
T(C): 37 (04-04-25 @ 14:36), Max: 37.7 (04-04-25 @ 10:08)  HR: 112 (04-04-25 @ 14:36) (72 - 112)  BP: 131/68 (04-04-25 @ 14:36) (128/67 - 131/68)  RR: 17 (04-04-25 @ 14:36) (16 - 17)  SpO2: 98% (04-04-25 @ 14:36) (97% - 98%)    CONSTITUTIONAL: Well groomed, no apparent distress  RESP: Nonlabored respirations   CV: RRR, +S1S2  GI: Softly distended, tender to palpation in RUQ and RLQ,   SKIN: No rashes or ulcers noted; no subcutaneous nodules or induration palpable  NEURO: CN II-XII intact; normal reflexes in upper and lower extremities, sensation intact in upper and lower extremities b/l to light touch   PSYCH: Appropriate insight/judgment; A+O x 3

## 2025-04-04 NOTE — ED ADULT NURSE NOTE - OBJECTIVE STATEMENT
Patient coming to ED for abdominal pain, nausea, generalized weakness and constipation x 1 week. Patient A&OX4. Breathing non-labored. Labs sent. Right 20G IVL in place and tolerating well. IV Tylenol in place. Plan of care in progress.

## 2025-04-05 LAB
ANION GAP SERPL CALC-SCNC: 14 MMOL/L — SIGNIFICANT CHANGE UP (ref 7–14)
BLOOD GAS VENOUS COMPREHENSIVE RESULT: SIGNIFICANT CHANGE UP
BUN SERPL-MCNC: 25 MG/DL — HIGH (ref 7–23)
CALCIUM SERPL-MCNC: 8.3 MG/DL — LOW (ref 8.4–10.5)
CHLORIDE SERPL-SCNC: 96 MMOL/L — LOW (ref 98–107)
CO2 SERPL-SCNC: 22 MMOL/L — SIGNIFICANT CHANGE UP (ref 22–31)
CREAT SERPL-MCNC: 1.64 MG/DL — HIGH (ref 0.5–1.3)
EGFR: 43 ML/MIN/1.73M2 — LOW
EGFR: 43 ML/MIN/1.73M2 — LOW
GLUCOSE SERPL-MCNC: 143 MG/DL — HIGH (ref 70–99)
HCT VFR BLD CALC: 27.9 % — LOW (ref 39–50)
HGB BLD-MCNC: 8.7 G/DL — LOW (ref 13–17)
MAGNESIUM SERPL-MCNC: 1.2 MG/DL — LOW (ref 1.6–2.6)
MCHC RBC-ENTMCNC: 23.9 PG — LOW (ref 27–34)
MCHC RBC-ENTMCNC: 31.2 G/DL — LOW (ref 32–36)
MCV RBC AUTO: 76.6 FL — LOW (ref 80–100)
NRBC # BLD AUTO: 0 K/UL — SIGNIFICANT CHANGE UP (ref 0–0)
NRBC # FLD: 0 K/UL — SIGNIFICANT CHANGE UP (ref 0–0)
NRBC BLD AUTO-RTO: 0 /100 WBCS — SIGNIFICANT CHANGE UP (ref 0–0)
PHOSPHATE SERPL-MCNC: 3.2 MG/DL — SIGNIFICANT CHANGE UP (ref 2.5–4.5)
PLATELET # BLD AUTO: 238 K/UL — SIGNIFICANT CHANGE UP (ref 150–400)
POTASSIUM SERPL-MCNC: 3.4 MMOL/L — LOW (ref 3.5–5.3)
POTASSIUM SERPL-SCNC: 3.4 MMOL/L — LOW (ref 3.5–5.3)
RBC # BLD: 3.64 M/UL — LOW (ref 4.2–5.8)
RBC # FLD: 17 % — HIGH (ref 10.3–14.5)
SODIUM SERPL-SCNC: 132 MMOL/L — LOW (ref 135–145)
WBC # BLD: 21.34 K/UL — HIGH (ref 3.8–10.5)
WBC # FLD AUTO: 21.34 K/UL — HIGH (ref 3.8–10.5)

## 2025-04-05 RX ORDER — HYDROMORPHONE/SOD CHLOR,ISO/PF 2 MG/10 ML
0.25 SYRINGE (ML) INJECTION EVERY 6 HOURS
Refills: 0 | Status: DISCONTINUED | OUTPATIENT
Start: 2025-04-05 | End: 2025-04-08

## 2025-04-05 RX ORDER — ACETAMINOPHEN 500 MG/5ML
1000 LIQUID (ML) ORAL EVERY 6 HOURS
Refills: 0 | Status: COMPLETED | OUTPATIENT
Start: 2025-04-05 | End: 2025-04-06

## 2025-04-05 RX ORDER — MAGNESIUM SULFATE 500 MG/ML
2 SYRINGE (ML) INJECTION ONCE
Refills: 0 | Status: COMPLETED | OUTPATIENT
Start: 2025-04-05 | End: 2025-04-05

## 2025-04-05 RX ORDER — HYDROMORPHONE/SOD CHLOR,ISO/PF 2 MG/10 ML
0.5 SYRINGE (ML) INJECTION EVERY 4 HOURS
Refills: 0 | Status: DISCONTINUED | OUTPATIENT
Start: 2025-04-05 | End: 2025-04-08

## 2025-04-05 RX ADMIN — Medication 25 GRAM(S): at 21:41

## 2025-04-05 RX ADMIN — Medication 400 MILLIGRAM(S): at 12:37

## 2025-04-05 RX ADMIN — Medication 25 GRAM(S): at 13:43

## 2025-04-05 RX ADMIN — Medication 1000 MILLIGRAM(S): at 18:30

## 2025-04-05 RX ADMIN — Medication 25 GRAM(S): at 09:05

## 2025-04-05 RX ADMIN — Medication 400 MILLIGRAM(S): at 18:00

## 2025-04-05 RX ADMIN — SODIUM CHLORIDE 100 MILLILITER(S): 9 INJECTION, SOLUTION INTRAVENOUS at 21:41

## 2025-04-05 RX ADMIN — HEPARIN SODIUM 5000 UNIT(S): 1000 INJECTION INTRAVENOUS; SUBCUTANEOUS at 21:42

## 2025-04-05 RX ADMIN — Medication 1000 MILLIGRAM(S): at 13:00

## 2025-04-05 RX ADMIN — HEPARIN SODIUM 5000 UNIT(S): 1000 INJECTION INTRAVENOUS; SUBCUTANEOUS at 00:05

## 2025-04-05 RX ADMIN — Medication 400 MILLIGRAM(S): at 06:08

## 2025-04-05 RX ADMIN — Medication 40 MILLIEQUIVALENT(S): at 12:41

## 2025-04-05 RX ADMIN — HEPARIN SODIUM 5000 UNIT(S): 1000 INJECTION INTRAVENOUS; SUBCUTANEOUS at 06:09

## 2025-04-05 RX ADMIN — LACOSAMIDE 120 MILLIGRAM(S): 150 TABLET, FILM COATED ORAL at 00:39

## 2025-04-05 RX ADMIN — LACOSAMIDE 120 MILLIGRAM(S): 150 TABLET, FILM COATED ORAL at 12:44

## 2025-04-05 RX ADMIN — Medication 1000 MILLIGRAM(S): at 01:05

## 2025-04-05 RX ADMIN — HEPARIN SODIUM 5000 UNIT(S): 1000 INJECTION INTRAVENOUS; SUBCUTANEOUS at 14:00

## 2025-04-05 RX ADMIN — SODIUM CHLORIDE 100 MILLILITER(S): 9 INJECTION, SOLUTION INTRAVENOUS at 06:08

## 2025-04-05 RX ADMIN — Medication 400 MILLIGRAM(S): at 00:05

## 2025-04-05 RX ADMIN — SODIUM CHLORIDE 100 MILLILITER(S): 9 INJECTION, SOLUTION INTRAVENOUS at 12:42

## 2025-04-05 NOTE — PATIENT PROFILE ADULT - FALL HARM RISK - HARM RISK INTERVENTIONS

## 2025-04-05 NOTE — PROGRESS NOTE ADULT - SUBJECTIVE AND OBJECTIVE BOX
24 Hour Events:  - NAOE    SUBJECTIVE: Pt seen at bedside during AM rounds. No o/n events, patient resting comfortably. -/-    Vital Signs Last 24 Hrs  T(C): 36.5 (2025 05:11), Max: 37.7 (2025 10:08)  T(F): 97.7 (2025 05:11), Max: 99.9 (2025 10:08)  HR: 73 (2025 05:11) (72 - 112)  BP: 113/64 (2025 05:44) (93/44 - 136/75)  BP(mean): 93 (2025 21:30) (77 - 102)  RR: 18 (2025 05:11) (11 - 20)  SpO2: 96% (2025 05:11) (96% - 100%)    Parameters below as of 2025 05:11  Patient On (Oxygen Delivery Method): room air        I&O's Summary    2025 07:01  -  2025 07:00  --------------------------------------------------------  IN: 260 mL / OUT: 1700 mL / NET: -1440 mL        LABS:                        8.7    21.34 )-----------( 238      ( 2025 06:30 )             27.9     04-04    130[L]  |  90[L]  |  23  ----------------------------<  197[H]  3.2[L]   |  18[L]  |  1.87[H]    Ca    9.9      2025 10:01    TPro  6.9  /  Alb  3.6  /  TBili  1.6[H]  /  DBili  x   /  AST  20  /  ALT  17  /  AlkPhos  69  04-04    PT/INR - ( 2025 10:55 )   PT: 17.1 sec;   INR: 1.44 ratio         PTT - ( 2025 10:55 )  PTT:28.2 sec  Urinalysis Basic - ( 2025 13:30 )    Color: Orange / Appearance: Cloudy / S.022 / pH: x  Gluc: x / Ketone: Negative mg/dL  / Bili: Small / Urobili: 1.0 mg/dL   Blood: x / Protein: 30 mg/dL / Nitrite: Negative   Leuk Esterase: Negative / RBC: 1 /HPF / WBC 2 /HPF   Sq Epi: x / Non Sq Epi: 6 /HPF / Bacteria: Negative /HPF        Physical Exam:  General Appearance: Appears well, NAD  Neck: Supple  Chest: Equal expansion bilaterally, equal breath sounds  CV: Pulse present  Abdomen: Soft, nontender, appropriate incisional tenderness, staples on lower midline laparotomy intact, chikis in place. Gauze and tape changed on AM rounds  Extremities: Grossly symmetric, SCD's in place

## 2025-04-05 NOTE — CHART NOTE - NSCHARTNOTEFT_GEN_A_CORE
Post Operative Note  Patient: DOMINGO CAPELLAN 75y (1950) Male   MRN: [id number]  Location: [facility]  Visit: 04-04-25 Inpatient  Date: 04-05-25 @ 00:21    Procedure: S/P exploratory laparotomy, R hemicolectomy, ileo-transverse side-to-side anastomosis (04-)    Subjective: Patient seen and examined post operatively. Reports pain as controlled. Denies nausea, vomiting, fever, chills, chest pain, SOB, cough.  ________________________________________________  Objective:  Vitals: T(F): 97.7 (04-04-25 @ 22:33), Max: 99.9 (04-04-25 @ 10:08)  HR: 74 (04-04-25 @ 22:33)  BP: 120/69 (04-04-25 @ 22:33) (106/62 - 136/75)  RR: 18 (04-04-25 @ 22:33)  SpO2: 96% (04-04-25 @ 22:33)    Physical Examination:  General: NAD, resting comfortably in bed  HEENT: Normocephalic atraumatic  Respiratory: Nonlabored respirations, normal CW expansion.  Cardio: regular rate, normotensive  Abdomen: softly distended, appropriately tender, midline incision with dressing, slight strikethrough   ________________________________________________  A:  75M w/ PMHx CAD s/p CABG, HTN, HLD, partial sz; PSHx bilat femoral hernia repair, R inguinal hernia repair; p/w worsening constipation, abdominal pain, N/V; CT AP 04- showed LBO 2/2 hepatic flexure mass; s/p exploratory laparotomy, R hemicolectomy, ileo-transverse side-to-side anastomosis (04-).   P:  - Abx: PIP/Tazo  - Pain control: APAP, OXY   - Diet: NPO  - IVF: LR 40mL/hr  - Activity: OOB to chair after PACU  - DVT ppx: Heparin 5000 Units SubQ q8h    B team surgery  [id number]

## 2025-04-06 LAB
ANION GAP SERPL CALC-SCNC: 15 MMOL/L — HIGH (ref 7–14)
BUN SERPL-MCNC: 27 MG/DL — HIGH (ref 7–23)
CALCIUM SERPL-MCNC: 9.1 MG/DL — SIGNIFICANT CHANGE UP (ref 8.4–10.5)
CHLORIDE SERPL-SCNC: 99 MMOL/L — SIGNIFICANT CHANGE UP (ref 98–107)
CO2 SERPL-SCNC: 22 MMOL/L — SIGNIFICANT CHANGE UP (ref 22–31)
CREAT SERPL-MCNC: 1.7 MG/DL — HIGH (ref 0.5–1.3)
EGFR: 41 ML/MIN/1.73M2 — LOW
EGFR: 41 ML/MIN/1.73M2 — LOW
GLUCOSE SERPL-MCNC: 113 MG/DL — HIGH (ref 70–99)
HCT VFR BLD CALC: 30.4 % — LOW (ref 39–50)
HGB BLD-MCNC: 9.5 G/DL — LOW (ref 13–17)
MAGNESIUM SERPL-MCNC: 1.8 MG/DL — SIGNIFICANT CHANGE UP (ref 1.6–2.6)
MCHC RBC-ENTMCNC: 23.8 PG — LOW (ref 27–34)
MCHC RBC-ENTMCNC: 31.3 G/DL — LOW (ref 32–36)
MCV RBC AUTO: 76 FL — LOW (ref 80–100)
NRBC # BLD AUTO: 0 K/UL — SIGNIFICANT CHANGE UP (ref 0–0)
NRBC # FLD: 0 K/UL — SIGNIFICANT CHANGE UP (ref 0–0)
NRBC BLD AUTO-RTO: 0 /100 WBCS — SIGNIFICANT CHANGE UP (ref 0–0)
PHOSPHATE SERPL-MCNC: 1.8 MG/DL — LOW (ref 2.5–4.5)
PLATELET # BLD AUTO: 334 K/UL — SIGNIFICANT CHANGE UP (ref 150–400)
POTASSIUM SERPL-MCNC: 3.5 MMOL/L — SIGNIFICANT CHANGE UP (ref 3.5–5.3)
POTASSIUM SERPL-SCNC: 3.5 MMOL/L — SIGNIFICANT CHANGE UP (ref 3.5–5.3)
RBC # BLD: 4 M/UL — LOW (ref 4.2–5.8)
RBC # FLD: 16.8 % — HIGH (ref 10.3–14.5)
SODIUM SERPL-SCNC: 136 MMOL/L — SIGNIFICANT CHANGE UP (ref 135–145)
WBC # BLD: 21.52 K/UL — HIGH (ref 3.8–10.5)
WBC # FLD AUTO: 21.52 K/UL — HIGH (ref 3.8–10.5)

## 2025-04-06 RX ORDER — SOD PHOS DI, MONO/K PHOS MONO 250 MG
2 TABLET ORAL ONCE
Refills: 0 | Status: COMPLETED | OUTPATIENT
Start: 2025-04-06 | End: 2025-04-06

## 2025-04-06 RX ORDER — ZONISAMIDE 25 MG
100 CAPSULE ORAL DAILY
Refills: 0 | Status: DISCONTINUED | OUTPATIENT
Start: 2025-04-07 | End: 2025-04-10

## 2025-04-06 RX ORDER — ACETAMINOPHEN 500 MG/5ML
1000 LIQUID (ML) ORAL EVERY 6 HOURS
Refills: 0 | Status: COMPLETED | OUTPATIENT
Start: 2025-04-06 | End: 2025-04-07

## 2025-04-06 RX ORDER — POTASSIUM CHLORIDE, DEXTROSE MONOHYDRATE AND SODIUM CHLORIDE 150; 5; 900 MG/100ML; G/100ML; MG/100ML
1000 INJECTION, SOLUTION INTRAVENOUS
Refills: 0 | Status: DISCONTINUED | OUTPATIENT
Start: 2025-04-06 | End: 2025-04-07

## 2025-04-06 RX ORDER — POTASSIUM CHLORIDE, DEXTROSE MONOHYDRATE AND SODIUM CHLORIDE 150; 5; 900 MG/100ML; G/100ML; MG/100ML
1000 INJECTION, SOLUTION INTRAVENOUS
Refills: 0 | Status: DISCONTINUED | OUTPATIENT
Start: 2025-04-06 | End: 2025-04-06

## 2025-04-06 RX ADMIN — HEPARIN SODIUM 5000 UNIT(S): 1000 INJECTION INTRAVENOUS; SUBCUTANEOUS at 13:38

## 2025-04-06 RX ADMIN — Medication 400 MILLIGRAM(S): at 13:38

## 2025-04-06 RX ADMIN — HEPARIN SODIUM 5000 UNIT(S): 1000 INJECTION INTRAVENOUS; SUBCUTANEOUS at 21:10

## 2025-04-06 RX ADMIN — LACOSAMIDE 120 MILLIGRAM(S): 150 TABLET, FILM COATED ORAL at 16:02

## 2025-04-06 RX ADMIN — Medication 400 MILLIGRAM(S): at 20:48

## 2025-04-06 RX ADMIN — Medication 25 GRAM(S): at 09:19

## 2025-04-06 RX ADMIN — LACOSAMIDE 120 MILLIGRAM(S): 150 TABLET, FILM COATED ORAL at 00:14

## 2025-04-06 RX ADMIN — Medication 400 MILLIGRAM(S): at 05:39

## 2025-04-06 RX ADMIN — Medication 25 GRAM(S): at 21:10

## 2025-04-06 RX ADMIN — Medication 1000 MILLIGRAM(S): at 06:16

## 2025-04-06 RX ADMIN — Medication 1000 MILLIGRAM(S): at 21:48

## 2025-04-06 RX ADMIN — POTASSIUM CHLORIDE, DEXTROSE MONOHYDRATE AND SODIUM CHLORIDE 50 MILLILITER(S): 150; 5; 900 INJECTION, SOLUTION INTRAVENOUS at 20:48

## 2025-04-06 RX ADMIN — POTASSIUM CHLORIDE, DEXTROSE MONOHYDRATE AND SODIUM CHLORIDE 50 MILLILITER(S): 150; 5; 900 INJECTION, SOLUTION INTRAVENOUS at 09:55

## 2025-04-06 RX ADMIN — Medication 1000 MILLIGRAM(S): at 14:00

## 2025-04-06 RX ADMIN — Medication 2 PACKET(S): at 06:52

## 2025-04-06 RX ADMIN — HEPARIN SODIUM 5000 UNIT(S): 1000 INJECTION INTRAVENOUS; SUBCUTANEOUS at 05:40

## 2025-04-06 NOTE — PHYSICAL THERAPY INITIAL EVALUATION ADULT - ADDITIONAL COMMENTS
Patient lives with wife and daughter in a private home with 5 steps to enter and 12-14 steps to the bedroom on the second floor. Prior to admission, patient performed all functional and daily activities independently. Patient owns a cane but does not use it.    Patient left semi-reclined in bed, noting mild dizziness and /75 mmHg, SpO2 100%,  bpm. All lines intact. Call bell within reach. Bed alarm on. NANI escudero.

## 2025-04-06 NOTE — PROGRESS NOTE ADULT - SUBJECTIVE AND OBJECTIVE BOX
Surgery Daily Progress Note  =====================================================    INTERVAL EVENTS: NAEO    SUBJECTIVE: Patient seen at bedside during AM rounds. Resting comfortably, pain appropriately controlled.  +/+, had small BM yesterday. Slava removed on AM rounds. Pending PT consult.     --------------------------------------------------------------------------------------  VITAL SIGNS:  T(C): 36.7 (04-06-25 @ 05:14), Max: 37 (04-05-25 @ 17:18)  HR: 96 (04-06-25 @ 05:14) (79 - 96)  BP: 157/82 (04-06-25 @ 05:14) (102/50 - 157/82)  RR: 18 (04-06-25 @ 05:14) (16 - 18)  SpO2: 99% (04-06-25 @ 05:14) (96% - 100%)  --------------------------------------------------------------------------------------  MEDICATIONS:   acetaminophen   IVPB .. 1000 milliGRAM(s) IV Intermittent every 6 hours  dextrose 5% + sodium chloride 0.45% with potassium chloride 20 mEq/L 1000 milliLiter(s) IV Continuous <Continuous>  heparin   Injectable 5000 Unit(s) SubCutaneous every 8 hours  HYDROmorphone  Injectable 0.25 milliGRAM(s) IV Push every 6 hours PRN  HYDROmorphone  Injectable 0.5 milliGRAM(s) IV Push every 4 hours PRN  lacosamide IVPB 100 milliGRAM(s) IV Intermittent every 12 hours  piperacillin/tazobactam IVPB.. 3.375 Gram(s) IV Intermittent every 12 hours  potassium chloride  10 mEq/100 mL IVPB 10 milliEquivalent(s) IV Intermittent every 1 hour    --------------------------------------------------------------------------------------  INTAKE/OUTPUT:     04-05-25 @ 07:01  -  04-06-25 @ 07:00  --------------------------------------------------------  IN: 1940 mL / OUT: 1925 mL / NET: 15 mL      --------------------------------------------------------------------------------------  Physical Exam:  General Appearance: Appears well, NAD  Neck: Supple  Chest: Equal expansion bilaterally, equal breath sounds  CV: Pulse present  Abdomen: Soft, nontender, appropriate incisional tenderness, staples on lower midline laparotomy intact, slava removed. Gauze and tape changed on AM rounds  Extremities: Grossly symmetric, SCD's in place     --------------------------------------------------------------------------------------

## 2025-04-06 NOTE — PHYSICAL THERAPY INITIAL EVALUATION ADULT - MANUAL MUSCLE TESTING RESULTS, REHAB EVAL
Patient's upper and lower extremities graded 3+/5 on MMT scale based upon functional activities and observations./grossly assessed due to

## 2025-04-06 NOTE — PHYSICAL THERAPY INITIAL EVALUATION ADULT - GENERAL OBSERVATIONS, REHAB EVAL
Patient was found semi-reclined in chair in NAD. Wife at bedside. A&Ox4, +bilateral SCDs, +IV, +bañuelos. OK for PT per NANI Dutton. Patient agreeable to PT evaluation. Resting HR 98 bpm.

## 2025-04-06 NOTE — PHYSICAL THERAPY INITIAL EVALUATION ADULT - LEVEL OF INDEPENDENCE, REHAB EVAL
Problem: Falls - Risk of 
Goal: *Absence of Falls Description Document Elisabeth Sarabia Fall Risk and appropriate interventions in the flowsheet. Outcome: Progressing Towards Goal 
Note:  
Fall Risk Interventions: 
Mobility Interventions: Bed/chair exit alarm Mentation Interventions: Bed/chair exit alarm Medication Interventions: Bed/chair exit alarm Elimination Interventions: Call light in reach History of Falls Interventions: Bed/chair exit alarm Problem: Breathing Pattern - Ineffective Goal: *Absence of hypoxia Outcome: Progressing Towards Goal 
  
 minimum assist (75% patients effort)

## 2025-04-06 NOTE — PHYSICAL THERAPY INITIAL EVALUATION ADULT - PERTINENT HX OF CURRENT PROBLEM, REHAB EVAL
Patient is a 75 year old male status post exploratory laparotomy and right hemicolectomy for right colon mass at hepatic flexure, and side to side anastamosis for ileo- transverse colon. CT scan revealed large bowel obstruction to level of the hepatic flexure, concerning for colonic neoplasm, and indeterminant left renal lesion. X-ray showed distended stomach.

## 2025-04-06 NOTE — PHYSICAL THERAPY INITIAL EVALUATION ADULT - TRANSFER SAFETY CONCERNS NOTED: SIT/STAND, REHAB EVAL
Patient required minimal assistance with sit to stand transfer from toilet due to decreased seat height. Patient reports dizziness upon standing that resolved in supine on bed./losing balance

## 2025-04-06 NOTE — PHYSICAL THERAPY INITIAL EVALUATION ADULT - DID THE PATIENT HAVE SURGERY?
Exploratory laparotomy and right hemicolectomy for right colon mass at hepatic flexure, Side to side anastamosis for ileo- transverse colon with chandra technique/yes

## 2025-04-06 NOTE — PHYSICAL THERAPY INITIAL EVALUATION ADULT - GAIT PATTERN USED, PT EVAL
Patient ambulated from bed to toilet, noting dizziness that subsided in supine in bed. /75 mmHg, SpO2 100%,  bpm post ambulation. NANI Dutton notified./3-point gait

## 2025-04-07 LAB
ANION GAP SERPL CALC-SCNC: 17 MMOL/L — HIGH (ref 7–14)
BUN SERPL-MCNC: 33 MG/DL — HIGH (ref 7–23)
CALCIUM SERPL-MCNC: 8.8 MG/DL — SIGNIFICANT CHANGE UP (ref 8.4–10.5)
CHLORIDE SERPL-SCNC: 95 MMOL/L — LOW (ref 98–107)
CO2 SERPL-SCNC: 23 MMOL/L — SIGNIFICANT CHANGE UP (ref 22–31)
CREAT SERPL-MCNC: 1.7 MG/DL — HIGH (ref 0.5–1.3)
EGFR: 41 ML/MIN/1.73M2 — LOW
EGFR: 41 ML/MIN/1.73M2 — LOW
GLUCOSE SERPL-MCNC: 182 MG/DL — HIGH (ref 70–99)
HCT VFR BLD CALC: 33.5 % — LOW (ref 39–50)
HGB BLD-MCNC: 10.2 G/DL — LOW (ref 13–17)
MAGNESIUM SERPL-MCNC: 1.6 MG/DL — SIGNIFICANT CHANGE UP (ref 1.6–2.6)
MCHC RBC-ENTMCNC: 23.4 PG — LOW (ref 27–34)
MCHC RBC-ENTMCNC: 30.4 G/DL — LOW (ref 32–36)
MCV RBC AUTO: 76.8 FL — LOW (ref 80–100)
NRBC # BLD AUTO: 0.02 K/UL — HIGH (ref 0–0)
NRBC # FLD: 0.02 K/UL — HIGH (ref 0–0)
NRBC BLD AUTO-RTO: 0 /100 WBCS — SIGNIFICANT CHANGE UP (ref 0–0)
PHOSPHATE SERPL-MCNC: 2.9 MG/DL — SIGNIFICANT CHANGE UP (ref 2.5–4.5)
PLATELET # BLD AUTO: 452 K/UL — HIGH (ref 150–400)
POTASSIUM SERPL-MCNC: 3.6 MMOL/L — SIGNIFICANT CHANGE UP (ref 3.5–5.3)
POTASSIUM SERPL-SCNC: 3.6 MMOL/L — SIGNIFICANT CHANGE UP (ref 3.5–5.3)
RBC # BLD: 4.36 M/UL — SIGNIFICANT CHANGE UP (ref 4.2–5.8)
RBC # FLD: 17 % — HIGH (ref 10.3–14.5)
SODIUM SERPL-SCNC: 135 MMOL/L — SIGNIFICANT CHANGE UP (ref 135–145)
WBC # BLD: 25.11 K/UL — HIGH (ref 3.8–10.5)
WBC # FLD AUTO: 25.11 K/UL — HIGH (ref 3.8–10.5)

## 2025-04-07 RX ORDER — MAGNESIUM SULFATE 500 MG/ML
2 SYRINGE (ML) INJECTION ONCE
Refills: 0 | Status: COMPLETED | OUTPATIENT
Start: 2025-04-07 | End: 2025-04-07

## 2025-04-07 RX ORDER — POTASSIUM CHLORIDE, DEXTROSE MONOHYDRATE AND SODIUM CHLORIDE 150; 5; 900 MG/100ML; G/100ML; MG/100ML
1000 INJECTION, SOLUTION INTRAVENOUS
Refills: 0 | Status: DISCONTINUED | OUTPATIENT
Start: 2025-04-07 | End: 2025-04-09

## 2025-04-07 RX ORDER — ACETAMINOPHEN 500 MG/5ML
1000 LIQUID (ML) ORAL EVERY 6 HOURS
Refills: 0 | Status: COMPLETED | OUTPATIENT
Start: 2025-04-07 | End: 2025-04-08

## 2025-04-07 RX ADMIN — Medication 400 MILLIGRAM(S): at 02:12

## 2025-04-07 RX ADMIN — Medication 400 MILLIGRAM(S): at 14:30

## 2025-04-07 RX ADMIN — HEPARIN SODIUM 5000 UNIT(S): 1000 INJECTION INTRAVENOUS; SUBCUTANEOUS at 22:10

## 2025-04-07 RX ADMIN — POTASSIUM CHLORIDE, DEXTROSE MONOHYDRATE AND SODIUM CHLORIDE 50 MILLILITER(S): 150; 5; 900 INJECTION, SOLUTION INTRAVENOUS at 19:56

## 2025-04-07 RX ADMIN — Medication 25 GRAM(S): at 22:03

## 2025-04-07 RX ADMIN — Medication 400 MILLIGRAM(S): at 22:03

## 2025-04-07 RX ADMIN — HEPARIN SODIUM 5000 UNIT(S): 1000 INJECTION INTRAVENOUS; SUBCUTANEOUS at 05:19

## 2025-04-07 RX ADMIN — Medication 25 GRAM(S): at 09:55

## 2025-04-07 RX ADMIN — Medication 400 MILLIGRAM(S): at 08:15

## 2025-04-07 RX ADMIN — Medication 100 MILLIGRAM(S): at 11:45

## 2025-04-07 RX ADMIN — Medication 25 GRAM(S): at 09:02

## 2025-04-07 RX ADMIN — Medication 1000 MILLIGRAM(S): at 23:03

## 2025-04-07 RX ADMIN — POTASSIUM CHLORIDE, DEXTROSE MONOHYDRATE AND SODIUM CHLORIDE 50 MILLILITER(S): 150; 5; 900 INJECTION, SOLUTION INTRAVENOUS at 16:13

## 2025-04-07 RX ADMIN — Medication 1000 MILLIGRAM(S): at 03:12

## 2025-04-07 RX ADMIN — HEPARIN SODIUM 5000 UNIT(S): 1000 INJECTION INTRAVENOUS; SUBCUTANEOUS at 14:30

## 2025-04-07 NOTE — DISCHARGE NOTE PROVIDER - NPI NUMBER (FOR SYSADMIN USE ONLY) :
Body Mass Index: Care Instructions  Your Care Instructions    Body mass index (BMI) can help you see if your weight is raising your risk for health problems. It uses a formula to compare how much you weigh with how tall you are. · A BMI lower than 18.5 is considered underweight. · A BMI between 18.5 and 24.9 is considered healthy. · A BMI between 25 and 29.9 is considered overweight. A BMI of 30 or higher is considered obese. If your BMI is in the normal range, it means that you have a lower risk for weight-related health problems. If your BMI is in the overweight or obese range, you may be at increased risk for weight-related health problems, such as high blood pressure, heart disease, stroke, arthritis or joint pain, and diabetes. If your BMI is in the underweight range, you may be at increased risk for health problems such as fatigue, lower protection (immunity) against illness, muscle loss, bone loss, hair loss, and hormone problems. BMI is just one measure of your risk for weight-related health problems. You may be at higher risk for health problems if you are not active, you eat an unhealthy diet, or you drink too much alcohol or use tobacco products. Follow-up care is a key part of your treatment and safety. Be sure to make and go to all appointments, and call your doctor if you are having problems. It's also a good idea to know your test results and keep a list of the medicines you take. How can you care for yourself at home? · Practice healthy eating habits. This includes eating plenty of fruits, vegetables, whole grains, lean protein, and low-fat dairy. · If your doctor recommends it, get more exercise. Walking is a good choice. Bit by bit, increase the amount you walk every day. Try for at least 30 minutes on most days of the week. · Do not smoke. Smoking can increase your risk for health problems. If you need help quitting, talk to your doctor about stop-smoking programs and medicines. These can increase your chances of quitting for good. · Limit alcohol to 2 drinks a day for men and 1 drink a day for women. Too much alcohol can cause health problems. If you have a BMI higher than 25  · Your doctor may do other tests to check your risk for weight-related health problems. This may include measuring the distance around your waist. A waist measurement of more than 40 inches in men or 35 inches in women can increase the risk of weight-related health problems. · Talk with your doctor about steps you can take to stay healthy or improve your health. You may need to make lifestyle changes to lose weight and stay healthy, such as changing your diet and getting regular exercise. If you have a BMI lower than 18.5  · Your doctor may do other tests to check your risk for health problems. · Talk with your doctor about steps you can take to stay healthy or improve your health. You may need to make lifestyle changes to gain or maintain weight and stay healthy, such as getting more healthy foods in your diet and doing exercises to build muscle. Where can you learn more? Go to http://cuong-mary kay.info/. Enter S176 in the search box to learn more about \"Body Mass Index: Care Instructions. \"  Current as of: October 13, 2016  Content Version: 11.4  © 7338-1924 [employer]. Care instructions adapted under license by [facility] (which disclaims liability or warranty for this information). If you have questions about a medical condition or this instruction, always ask your healthcare professional. [address] any warranty or liability for your use of this information. [[id number]]

## 2025-04-07 NOTE — DISCHARGE NOTE PROVIDER - NSDCCPCAREPLAN_GEN_ALL_CORE_FT
PRINCIPAL DISCHARGE DIAGNOSIS  Diagnosis: Colonic mass  Assessment and Plan of Treatment:      PRINCIPAL DISCHARGE DIAGNOSIS  Diagnosis: Colonic mass  Assessment and Plan of Treatment:       SECONDARY DISCHARGE DIAGNOSES  Diagnosis: Abnormal CT scan  Assessment and Plan of Treatment: You were found to have the following incidental findings:  - Indeterminant 1.4 cm isodense left upper pole exophytic lesion on kidney.  These results were explained to you, and copy of the results were given to you.  Please follow up with your primary care provider for further evaluation and management.

## 2025-04-07 NOTE — DISCHARGE NOTE PROVIDER - CARE PROVIDER_API CALL
Fredi Jeffries  Colon/Rectal Surgery  [address], [address]  [place] [address]  Phone: [phone]  Fax: [phone]  Follow Up Time: 2 weeks

## 2025-04-07 NOTE — DISCHARGE NOTE PROVIDER - NSDCFUADDINST_GEN_ALL_CORE_FT
WOUND CARE:  Please keep incisions clean and dry. Please do not Scrub or rub incisions. Do not use lotion or powder on incisions  BATHING: Please do not submerge wound underwater. You may shower and/or sponge bathe 48 hours after surgery.  ACTIVITY: No heavy lifting or straining. Otherwise, you may return to your usual level of physical activity. If you are taking narcotic pain medication (such as Oxycodone) DO NOT drive a car, operate machinery or make important decisions.  DIET: Return to your usual diet.  NOTIFY YOUR SURGEON IF: You have any bleeding that does not stop, any pus draining from your wound(s), any fever (over 100.4 F) or chills, persistent nausea/vomiting, persistent diarrhea, or if your pain is not controlled on your discharge pain medications.  FOLLOW UP: Please follow up with your Surgeon (Dr. Jeffries) call today for appointment in 2 weeks

## 2025-04-07 NOTE — PROGRESS NOTE ADULT - SUBJECTIVE AND OBJECTIVE BOX
__________________________________________________________________   ===================>> SURGERY PROGRESS NOTE <<===================  -----------------------------------------------------------------------------------------------------------  Interval/Subjective:  Patient seen and examined. No acute events overnight. Vitals stable. Afebrile. Pain controlled with medications.   - low urine output recorded yesterday, no bowel function  __________________________________________________________________   ===================>> VITAL SIGNS / EXAM <<=========================  -----------------------------------------------------------------------------------------------------------  T(C): 36.6 (04:07), Max: 37.1 (21:48)  HR: 100 (04:07) (66 - 100)  BP: 152/78 (04:07) (122/88 - 160/87)  RR: 17 (04:07) (14 - 18)  SpO2: 96% (04:07) (96% - 100%)    I & O's:  IN:    Lactated Ringers: 900 mL    Oral Fluid: 1040 mL  Total IN: 1940 mL    OUT:    Indwelling Catheter - Urethral (mL) - 16Fr Garnett: 525 mL    Voided (mL): 1400 mL  Total OUT: 1925 mL    Physical Exam:  General: NAD, resting comfortably in bed  HEENT: Normocephalic atraumatic  Respiratory: Nonlabored respirations  Cardio: regular rate, normotensive  Abdomen: Soft, nontender, appropriate incisional tenderness, staples on lower midline laparotomy intact, chikis removed. Gauze and tape changed on AM rounds  __________________________________________________________________   ===================>> LAB AND IMAGING <<==========================  -----------------------------------------------------------------------------------------------------------  CBC: 25 @ 05:44          10.2   25.11 >----< 452          33.5    CBC: 25 @ 04:20          9.5   21.52 >----< 334          30.4    Chemistry: 25 @ 04:20  136|99|27    ------------< 113  3.5|22|1.70    Ca: 9.1 25 @ 04:20  M.80 25 @ 04:20  Phos: 1.8 25 @ 04:20  __________________________________________________________________   ===================>> ASSESSMENT AND PLAN <<======================  -----------------------------------------------------------------------------------------------------------  A:  75M w/ PMHx CAD s/p CABG, HTN, HLD, partial sz; PSHx bilat femoral hernia repair, R inguinal hernia repair; p/w worsening constipation, abdominal pain, N/V; CT AP 2025 showed LBO 2/2 hepatic flexure mass; s/p exploratory laparotomy, R hemicolectomy, ileo-transverse side-to-side anastomosis (/).  P:  - PT  - Abx: PIP/Tazo  - Pain control: APAP, OXY   - Diet: advancing to LRD  - IVF: D5 1/2NS @50/hr, f/u UOP and diet tolerance  - Activity: OOB to chair    - DVT ppx: Heparin 5000 Units SubQ q8h    [facility]  #[id number] __________________________________________________________________   ===================>> SURGERY PROGRESS NOTE <<===================  -----------------------------------------------------------------------------------------------------------  Interval/Subjective:  Patient seen and examined. No acute events overnight. Vitals stable. Afebrile. Pain controlled with medications.   - low urine output recorded yesterday, pt voided 500cc in the AM   - states he is +/+  __________________________________________________________________   ===================>> VITAL SIGNS / EXAM <<=========================  -----------------------------------------------------------------------------------------------------------  T(C): 36.6 (04:07), Max: 37.1 (21:48)  HR: 100 (04:07) (66 - 100)  BP: 152/78 (04:07) (122/88 - 160/87)  RR: 17 (04:07) (14 - 18)  SpO2: 96% (04:07) (96% - 100%)    I & O's:  IN:    Lactated Ringers: 900 mL    Oral Fluid: 1040 mL  Total IN: 1940 mL    OUT:    Indwelling Catheter - Urethral (mL) - 16Fr Garnett: 525 mL    Voided (mL): 1400 mL  Total OUT: 1925 mL    Physical Exam:  General: NAD, resting comfortably in bed  HEENT: Normocephalic atraumatic  Respiratory: Nonlabored respirations  Cardio: regular rate, normotensive  Abdomen: Soft, nontender, appropriate incisional tenderness, staples on lower midline laparotomy intact, chikis removed. Gauze and tape changed on AM rounds  __________________________________________________________________   ===================>> LAB AND IMAGING <<==========================  -----------------------------------------------------------------------------------------------------------  CBC: 25 @ 05:44          10.2   25.11 >----< 452          33.5    CBC: 25 @ 04:20          9.5   21.52 >----< 334          30.4    Chemistry: 25 @ 04:20  136|99|27    ------------< 113  3.5|22|1.70    Ca: 9.1 25 @ 04:20  M.80 25 @ 04:20  Phos: 1.8 25 @ 04:20  __________________________________________________________________   ===================>> ASSESSMENT AND PLAN <<======================  -----------------------------------------------------------------------------------------------------------  A:  75M w/ PMHx CAD s/p CABG, HTN, HLD, partial sz; PSHx bilat femoral hernia repair, R inguinal hernia repair; p/w worsening constipation, abdominal pain, N/V; CT AP 2025 showed LBO 2/2 hepatic flexure mass; s/p exploratory laparotomy, R hemicolectomy, ileo-transverse side-to-side anastomosis ().  P:  - PT: rec SHANNON  - Abx: PIP/Tazo  - Pain control: APAP, OXY   - Diet: advancing to LRD  - IVF: D5 1/2NS @50/hr, f/u UOP and diet tolerance  - Activity: OOB to chair    - DVT ppx: Heparin 5000 Units SubQ q8h    [facility]  #[id number]

## 2025-04-07 NOTE — PROVIDER CONTACT NOTE (OTHER) - BACKGROUND
S/P Exploratory laparotomy, right hemicolectomy, ileostomy
PT had Ex Lap R hemicolectomy with stool impaction

## 2025-04-07 NOTE — DISCHARGE NOTE PROVIDER - NSDCFUADDAPPT_GEN_ALL_CORE_FT
Please schedule an appointment with your primary care provider within 1-2 weeks of discharge to go over your recent hospitalization. Please bring your discharge paperwork with you.  Please schedule an appointment with your primary care provider within 1-2 weeks of discharge to go over your recent hospitalization. Please bring your discharge paperwork with you including the copy of your CT scan results describing the incidental finding of an Indeterminant 1.4 cm isodense left upper pole exophytic lesion on kidney.  These results were explained to you, and copy of the results were given to you. Your Primary Care Doctor may need to order further labs/tests/imaging to follow up on this finding. You have shown understanding and agreement with this plan for folllow up.  Please schedule an appointment with your primary care provider within 1-2 weeks of discharge to go over your recent hospitalization. Please bring your discharge paperwork with you including the copy of your CT scan results describing the incidental finding of an Indeterminant 1.4 cm isodense left upper pole exophytic lesion on kidney.  These results were explained to you, and copy of the results were given to you. Your Primary Care Doctor may need to order further labs/tests/imaging to follow up on this finding.   You were also noted to have elevated WBC count while in hospital which according to you and your wife this is not knew and is known to your Primary Care Doctor. Infectious workup was negative in hospital. Please follow up your white blood cell count with your Primary Care Doctor at your follow up appointment. You and your wife have shown understanding and agree to follow up with all of above.

## 2025-04-07 NOTE — DISCHARGE NOTE PROVIDER - HOSPITAL COURSE
75M PMH CAD s/p CABG cheyanne plavix, HLD, HTN, history of seizures, PSH of R inguinal hernia, presents with worsening constipation that started on 12/2024. Patient states his last BM is on 3/27, last flatus a few days prior. He states that his outpt PMD prescribed him laxatives, but the symptoms did not resolve. Upon further imaging, c/f large bowel obstruction to level of the hepatic flexure, concerning for colonic neoplasm. Patient admitted to colorectal surgery, NGT placed, and added on for emergent surgery. Pt went to the OR 4/4 and is s/p Exploratory laparotomy and right hemicolectomy. Patient tolerated the procedure well without complications. POD1 pt was advanced to CLD and bañuelos was dc'd, passed TOV. POD2 pt had return of bowel function with both flatus and BM and diet advanced to LRD. PT evaluated pt with recommendations for SHANNON.      75M PMH CAD s/p CABG cheyanne plavix, HLD, HTN, history of seizures, PSH of R inguinal hernia, presents with worsening constipation that started on 12/2024. Patient states his last BM is on 3/27, last flatus a few days prior. He states that his outpt PMD prescribed him laxatives, but the symptoms did not resolve. Upon further imaging, c/f large bowel obstruction to level of the hepatic flexure, concerning for colonic neoplasm. Patient admitted to colorectal surgery, NGT placed, and added on for emergent surgery. Pt went to the OR 4/4 and is s/p Exploratory laparotomy and right hemicolectomy. Surgery went will. Post operatively, patient did well. As patient had bowel function, diet was advanced as tolerated from Clears to Low Residue Diet. Pain was controlled on IV and then oral medications as patient tolerated oral diet. Garnett was removed and patient is voiding without difficulty. PT evaluated pt with recommendations for SHANNON. UA and CXR done for elevated WBC were both negative. Per Attending, patient is stable for discharge home and will follow up in office in 1 week. Patient understands and agrees with above.

## 2025-04-07 NOTE — DISCHARGE NOTE PROVIDER - NSDCMRMEDTOKEN_GEN_ALL_CORE_FT
atorvastatin 20 mg oral tablet: 1 tab(s) orally once a day  isosorbide mononitrate 30 mg oral tablet, extended release: 1 tab(s) orally once a day  Plavix 75 mg oral tablet: 1 tab(s) orally once a day  zonisamide 100 mg oral capsule: 1 cap(s) orally once a day   acetaminophen 500 mg oral tablet: 2 tab(s) orally every 6 hours as needed for  mild pain  atorvastatin 20 mg oral tablet: 1 tab(s) orally once a day  Eliquis 2.5 mg oral tablet: 1 tab(s) orally every 12 hours Take 1 tab by mouth every 12 hours for 28 days after discharge from hospital (extended DVT Prophylasix for Caprini 8)  isosorbide mononitrate 30 mg oral tablet, extended release: 1 tab(s) orally once a day  oxyCODONE 5 mg oral tablet: 1 tab(s) orally every 6 hours as needed for  moderate pain  Plavix 75 mg oral tablet: 1 tab(s) orally once a day  zonisamide 100 mg oral capsule: 1 cap(s) orally once a day

## 2025-04-08 LAB
ANION GAP SERPL CALC-SCNC: 10 MMOL/L — SIGNIFICANT CHANGE UP (ref 7–14)
BUN SERPL-MCNC: 33 MG/DL — HIGH (ref 7–23)
CALCIUM SERPL-MCNC: 8.6 MG/DL — SIGNIFICANT CHANGE UP (ref 8.4–10.5)
CHLORIDE SERPL-SCNC: 97 MMOL/L — LOW (ref 98–107)
CO2 SERPL-SCNC: 28 MMOL/L — SIGNIFICANT CHANGE UP (ref 22–31)
CREAT SERPL-MCNC: 1.57 MG/DL — HIGH (ref 0.5–1.3)
EGFR: 45 ML/MIN/1.73M2 — LOW
EGFR: 45 ML/MIN/1.73M2 — LOW
GLUCOSE SERPL-MCNC: 168 MG/DL — HIGH (ref 70–99)
HCT VFR BLD CALC: 29.7 % — LOW (ref 39–50)
HGB BLD-MCNC: 9.2 G/DL — LOW (ref 13–17)
MAGNESIUM SERPL-MCNC: 1.7 MG/DL — SIGNIFICANT CHANGE UP (ref 1.6–2.6)
MCHC RBC-ENTMCNC: 23.5 PG — LOW (ref 27–34)
MCHC RBC-ENTMCNC: 31 G/DL — LOW (ref 32–36)
MCV RBC AUTO: 76 FL — LOW (ref 80–100)
NRBC # BLD AUTO: 0.05 K/UL — HIGH (ref 0–0)
NRBC # FLD: 0.05 K/UL — HIGH (ref 0–0)
NRBC BLD AUTO-RTO: 0 /100 WBCS — SIGNIFICANT CHANGE UP (ref 0–0)
PHOSPHATE SERPL-MCNC: 2.6 MG/DL — SIGNIFICANT CHANGE UP (ref 2.5–4.5)
PLATELET # BLD AUTO: 443 K/UL — HIGH (ref 150–400)
POTASSIUM SERPL-MCNC: 3.5 MMOL/L — SIGNIFICANT CHANGE UP (ref 3.5–5.3)
POTASSIUM SERPL-SCNC: 3.5 MMOL/L — SIGNIFICANT CHANGE UP (ref 3.5–5.3)
RBC # BLD: 3.91 M/UL — LOW (ref 4.2–5.8)
RBC # FLD: 17.3 % — HIGH (ref 10.3–14.5)
SODIUM SERPL-SCNC: 135 MMOL/L — SIGNIFICANT CHANGE UP (ref 135–145)
WBC # BLD: 22.48 K/UL — HIGH (ref 3.8–10.5)
WBC # FLD AUTO: 22.48 K/UL — HIGH (ref 3.8–10.5)

## 2025-04-08 RX ORDER — ACETAMINOPHEN 500 MG/5ML
1000 LIQUID (ML) ORAL EVERY 6 HOURS
Refills: 0 | Status: DISCONTINUED | OUTPATIENT
Start: 2025-04-08 | End: 2025-04-09

## 2025-04-08 RX ORDER — MAGNESIUM SULFATE 500 MG/ML
2 SYRINGE (ML) INJECTION ONCE
Refills: 0 | Status: COMPLETED | OUTPATIENT
Start: 2025-04-08 | End: 2025-04-08

## 2025-04-08 RX ORDER — OXYCODONE HYDROCHLORIDE 30 MG/1
2.5 TABLET ORAL EVERY 4 HOURS
Refills: 0 | Status: DISCONTINUED | OUTPATIENT
Start: 2025-04-08 | End: 2025-04-10

## 2025-04-08 RX ORDER — OXYCODONE HYDROCHLORIDE 30 MG/1
5 TABLET ORAL EVERY 4 HOURS
Refills: 0 | Status: DISCONTINUED | OUTPATIENT
Start: 2025-04-08 | End: 2025-04-10

## 2025-04-08 RX ADMIN — HEPARIN SODIUM 5000 UNIT(S): 1000 INJECTION INTRAVENOUS; SUBCUTANEOUS at 13:54

## 2025-04-08 RX ADMIN — Medication 400 MILLIGRAM(S): at 16:45

## 2025-04-08 RX ADMIN — Medication 400 MILLIGRAM(S): at 06:01

## 2025-04-08 RX ADMIN — HEPARIN SODIUM 5000 UNIT(S): 1000 INJECTION INTRAVENOUS; SUBCUTANEOUS at 21:55

## 2025-04-08 RX ADMIN — Medication 25 GRAM(S): at 14:40

## 2025-04-08 RX ADMIN — Medication 1000 MILLIGRAM(S): at 22:15

## 2025-04-08 RX ADMIN — POTASSIUM CHLORIDE, DEXTROSE MONOHYDRATE AND SODIUM CHLORIDE 50 MILLILITER(S): 150; 5; 900 INJECTION, SOLUTION INTRAVENOUS at 12:16

## 2025-04-08 RX ADMIN — POTASSIUM CHLORIDE, DEXTROSE MONOHYDRATE AND SODIUM CHLORIDE 50 MILLILITER(S): 150; 5; 900 INJECTION, SOLUTION INTRAVENOUS at 21:55

## 2025-04-08 RX ADMIN — HEPARIN SODIUM 5000 UNIT(S): 1000 INJECTION INTRAVENOUS; SUBCUTANEOUS at 06:02

## 2025-04-08 RX ADMIN — Medication 400 MILLIGRAM(S): at 11:12

## 2025-04-08 RX ADMIN — Medication 100 MILLIGRAM(S): at 11:13

## 2025-04-08 RX ADMIN — POTASSIUM CHLORIDE, DEXTROSE MONOHYDRATE AND SODIUM CHLORIDE 50 MILLILITER(S): 150; 5; 900 INJECTION, SOLUTION INTRAVENOUS at 06:02

## 2025-04-08 RX ADMIN — Medication 25 GRAM(S): at 08:59

## 2025-04-08 RX ADMIN — Medication 25 GRAM(S): at 21:56

## 2025-04-08 RX ADMIN — Medication 400 MILLIGRAM(S): at 21:55

## 2025-04-08 NOTE — PROGRESS NOTE ADULT - SUBJECTIVE AND OBJECTIVE BOX
===================>> SURGERY PROGRESS NOTE <<===================  -----------------------------------------------------------------------------------------------------------  Interval/Subjective:  Patient seen and examined at bedside. Patient resting comfortably in bed. Patient states he threw up "a little" yesterday. He also complains of hiccoughs. Patient states he is passing flatus and having BMs. Patient has been OOB. Denies any other complaints at this time  __________________________________________________________________   ===================>> VITAL SIGNS / EXAM <<=========================  -----------------------------------------------------------------------------------------------------------  T(C): 36.4 (04-08-25 @ 05:29), Max: 37.1 (04-07-25 @ 17:02)  HR: 95 (04-08-25 @ 05:29) (92 - 102)  BP: 133/76 (04-08-25 @ 05:29) (100/76 - 154/83)  BP(mean): --  ABP: --  ABP(mean): --  RR: 18 (04-08-25 @ 05:29) (18 - 18)  SpO2: 95% (04-08-25 @ 05:29) (95% - 99%)      04-07 @ 07:01  -  04-08 @ 07:00  --------------------------------------------------------  IN:    dextrose 5% + sodium chloride 0.45% w/ Additives: 600 mL    IV PiggyBack: 200 mL    Oral Fluid: 780 mL  Total IN: 1580 mL    OUT:    Voided (mL): 900 mL  Total OUT: 900 mL    Total NET: 680 mL      Physical Exam:  General: NAD, resting comfortably in bed  HEENT: Normocephalic atraumatic  Respiratory: Nonlabored respirations  Cardio: regular rate  Abdomen: Soft, slightly distended, nontender, staples on lower midline laparotomy intact  __________________________________________________________________   ===================>> LAB AND IMAGING <<==========================  -----------------------------------------------------------------------------------------------------------  CBC (04-08 @ 04:54)                              9.2[L]                         22.48[H]  )----------------(  443[H]     --    % Neutrophils, --    % Lymphocytes, ANC: --                                  29.7[L]  CBC (04-07 @ 05:44)                              10.2[L]                         25.11[H]  )----------------(  452[H]     --    % Neutrophils, --    % Lymphocytes, ANC: --                                  33.5[L]    BMP (04-08 @ 04:54)             135     |  97[L]   |  33[H] 		Ca++ --      Ca 8.6                ---------------------------------( 168[H]		Mg 1.70               3.5     |  28      |  1.57[H]			Ph 2.6     BMP (04-07 @ 05:44)             135     |  95[L]   |  33[H] 		Ca++ --      Ca 8.8                ---------------------------------( 182[H]		Mg 1.60               3.6     |  23      |  1.70[H]			Ph 2.9       Urinalysis (04-08 @ 04:54):     Color:  / Appearance:  / SG:  / pH:  / Gluc: 168[H] / Ketones:  / Bili:  / Urobili:  / Protein : / Nitrites:  / Leuk.Est:  / RBC:  / WBC:  / Sq Epi:  / Non Sq Epi:  / Bacteria        -> Blood Blood-Peripheral Culture (04-04 @ 13:12)     NG    NG    No growth at 72 Hours    -> Blood Blood-Peripheral Culture (04-04 @ 13:02)     NG    NG    No growth at 72 Hours      __________________________________________________________________   ===================>> ASSESSMENT AND PLAN <<======================  -----------------------------------------------------------------------------------------------------------  A:  75M w/ PMHx CAD s/p CABG, HTN, HLD, partial sz; PSHx bilat femoral hernia repair, R inguinal hernia repair; p/w worsening constipation, abdominal pain, N/V; CT AP 04- showed 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  P:  - PT: rec SHANNON  - Abx: PIP/Tazo  - Pain control: APAP, OXY   - Diet: advancing to LRD  - IVF: D5 1/2NS @50/hr, f/u UOP and diet tolerance  - Activity: OOB to chair    - DVT ppx: Heparin 5000 Units SubQ q8h    [facility] A  #[id number]

## 2025-04-08 NOTE — CHART NOTE - NSCHARTNOTEFT_GEN_A_CORE
Incidental findings are findings on history, physical examination, lab work, and imaging that are not directly related to the patient's chief complaint and cause for hospital admission.     1. The incidental findings for this patient are (please list):  - Indeterminant 1.4 cm isodense left upper pole exophytic lesion.    2. Were these findings explained to the patient and/or their family (in the event that either the patient requests communication with their family or the patient is unable to understand or remember the findings and plan)?  Yes      3. Was a copy of the lab work/ imaging report given to the patient and/or their family?  Yes    4. Was the patient asked whether they can follow up with their PMD regarding this finding? If the patient says no, or does not have a PMD, you must identify a provider (i.e. Medicine Clinic or specialist) with whom the patient will follow up.  Wife and patient aware they can follow up with PMD once recovered surgically.     5. Was the finding documented on the discharge summary?  Yes    A team   #[id number] Incidental findings are findings on history, physical examination, lab work, and imaging that are not directly related to the patient's chief complaint and cause for hospital admission.     1. The incidental findings for this patient are (please list):  - Indeterminant 1.4 cm isodense left upper pole exophytic lesion on kidney.    2. Were these findings explained to the patient and/or their family (in the event that either the patient requests communication with their family or the patient is unable to understand or remember the findings and plan)?  Yes      3. Was a copy of the lab work/ imaging report given to the patient and/or their family?  Yes    4. Was the patient asked whether they can follow up with their PMD regarding this finding? If the patient says no, or does not have a PMD, you must identify a provider (i.e. Medicine Clinic or specialist) with whom the patient will follow up.  Wife and patient aware they can follow up with PMD once recovered surgically for further imaging (ultrasound/MRI recommended per imaging report)    5. Was the finding documented on the discharge summary?  Yes    A team   #[id number]

## 2025-04-09 ENCOUNTER — TRANSCRIPTION ENCOUNTER (OUTPATIENT)
Age: 75
End: 2025-04-09

## 2025-04-09 LAB
ANION GAP SERPL CALC-SCNC: 13 MMOL/L — SIGNIFICANT CHANGE UP (ref 7–14)
BUN SERPL-MCNC: 32 MG/DL — HIGH (ref 7–23)
CALCIUM SERPL-MCNC: 8.7 MG/DL — SIGNIFICANT CHANGE UP (ref 8.4–10.5)
CHLORIDE SERPL-SCNC: 94 MMOL/L — LOW (ref 98–107)
CO2 SERPL-SCNC: 26 MMOL/L — SIGNIFICANT CHANGE UP (ref 22–31)
CREAT SERPL-MCNC: 1.52 MG/DL — HIGH (ref 0.5–1.3)
CULTURE RESULTS: SIGNIFICANT CHANGE UP
CULTURE RESULTS: SIGNIFICANT CHANGE UP
EGFR: 47 ML/MIN/1.73M2 — LOW
EGFR: 47 ML/MIN/1.73M2 — LOW
GLUCOSE SERPL-MCNC: 175 MG/DL — HIGH (ref 70–99)
HCT VFR BLD CALC: 34.5 % — LOW (ref 39–50)
HGB BLD-MCNC: 10.4 G/DL — LOW (ref 13–17)
MAGNESIUM SERPL-MCNC: 1.7 MG/DL — SIGNIFICANT CHANGE UP (ref 1.6–2.6)
MCHC RBC-ENTMCNC: 23.6 PG — LOW (ref 27–34)
MCHC RBC-ENTMCNC: 30.1 G/DL — LOW (ref 32–36)
MCV RBC AUTO: 78.4 FL — LOW (ref 80–100)
NRBC # BLD AUTO: 0.15 K/UL — HIGH (ref 0–0)
NRBC # FLD: 0.15 K/UL — HIGH (ref 0–0)
NRBC BLD AUTO-RTO: 0 /100 WBCS — SIGNIFICANT CHANGE UP (ref 0–0)
PHOSPHATE SERPL-MCNC: 3 MG/DL — SIGNIFICANT CHANGE UP (ref 2.5–4.5)
PLATELET # BLD AUTO: 504 K/UL — HIGH (ref 150–400)
POTASSIUM SERPL-MCNC: 3.5 MMOL/L — SIGNIFICANT CHANGE UP (ref 3.5–5.3)
POTASSIUM SERPL-SCNC: 3.5 MMOL/L — SIGNIFICANT CHANGE UP (ref 3.5–5.3)
RBC # BLD: 4.4 M/UL — SIGNIFICANT CHANGE UP (ref 4.2–5.8)
RBC # FLD: 17.6 % — HIGH (ref 10.3–14.5)
SODIUM SERPL-SCNC: 133 MMOL/L — LOW (ref 135–145)
SPECIMEN SOURCE: SIGNIFICANT CHANGE UP
SPECIMEN SOURCE: SIGNIFICANT CHANGE UP
WBC # BLD: 22.32 K/UL — HIGH (ref 3.8–10.5)
WBC # FLD AUTO: 22.32 K/UL — HIGH (ref 3.8–10.5)

## 2025-04-09 RX ORDER — ACETAMINOPHEN 500 MG/5ML
1000 LIQUID (ML) ORAL EVERY 6 HOURS
Refills: 0 | Status: DISCONTINUED | OUTPATIENT
Start: 2025-04-09 | End: 2025-04-10

## 2025-04-09 RX ORDER — MAGNESIUM SULFATE 500 MG/ML
2 SYRINGE (ML) INJECTION ONCE
Refills: 0 | Status: COMPLETED | OUTPATIENT
Start: 2025-04-09 | End: 2025-04-09

## 2025-04-09 RX ADMIN — Medication 1000 MILLIGRAM(S): at 18:11

## 2025-04-09 RX ADMIN — HEPARIN SODIUM 5000 UNIT(S): 1000 INJECTION INTRAVENOUS; SUBCUTANEOUS at 06:52

## 2025-04-09 RX ADMIN — Medication 20 MILLIEQUIVALENT(S): at 10:37

## 2025-04-09 RX ADMIN — Medication 100 MILLIGRAM(S): at 12:03

## 2025-04-09 RX ADMIN — Medication 1000 MILLIGRAM(S): at 05:00

## 2025-04-09 RX ADMIN — Medication 1000 MILLIGRAM(S): at 11:37

## 2025-04-09 RX ADMIN — Medication 25 GRAM(S): at 09:55

## 2025-04-09 RX ADMIN — Medication 25 GRAM(S): at 09:10

## 2025-04-09 RX ADMIN — Medication 400 MILLIGRAM(S): at 04:37

## 2025-04-09 RX ADMIN — OXYCODONE HYDROCHLORIDE 5 MILLIGRAM(S): 30 TABLET ORAL at 11:03

## 2025-04-09 RX ADMIN — HEPARIN SODIUM 5000 UNIT(S): 1000 INJECTION INTRAVENOUS; SUBCUTANEOUS at 13:53

## 2025-04-09 RX ADMIN — OXYCODONE HYDROCHLORIDE 5 MILLIGRAM(S): 30 TABLET ORAL at 10:03

## 2025-04-09 RX ADMIN — Medication 400 MILLIGRAM(S): at 10:37

## 2025-04-09 RX ADMIN — HEPARIN SODIUM 5000 UNIT(S): 1000 INJECTION INTRAVENOUS; SUBCUTANEOUS at 21:44

## 2025-04-09 NOTE — PROGRESS NOTE ADULT - SUBJECTIVE AND OBJECTIVE BOX
__________________________________________________________________   ===================>> SURGERY PROGRESS NOTE <<===================  -----------------------------------------------------------------------------------------------------------  Interval/Subjective:  Patient seen and examined. No acute events overnight. Vitals stable. Afebrile. Pain controlled with medications.   __________________________________________________________________   ===================>> VITAL SIGNS / EXAM <<=========================  -----------------------------------------------------------------------------------------------------------  T(C): 36.6 (04:54), Max: 36.9 (17:36)  HR: 94 (04:54) (82 - 95)  BP: 139/75 (04:54) (128/69 - 151/84)  RR: 18 (04:54) (17 - 18)  SpO2: 96% (04:54) (96% - 100%)    I & O's:  IN:    dextrose 5% + sodium chloride 0.45% w/ Additives: 950 mL    IV PiggyBack: 200 mL    Oral Fluid: 580 mL  Total IN: 1730 mL    OUT:    Stool (mL): 1 mL    Voided (mL): 1000 mL  Total OUT: 1001 mL      Physical Exam:  General: NAD, resting comfortably in bed  HEENT: Normocephalic atraumatic  Respiratory: Nonlabored respirations  Cardio: regular rate, normotensive  Abdomen: soft, nontender, nondistended  Vascular: extremities are warm and well perfused.   __________________________________________________________________   ===================>> LAB AND IMAGING <<==========================  -----------------------------------------------------------------------------------------------------------  CBC: 25 @ 04:17          10.4   22.32 >----< 504          34.5    Chemistry: 25 @ 04:17  133|94|32    ------------< 175  3.5|26|1.52    Ca: 8.7 25 @ 04:17  M.70 25 @ 04:17  Phos: 3.0 25 @ 04:17    LFT's: 25 @ 04:17  AST: --  ALT: --  ALP: --  T.Bili: --  D.Bili: --  CBC: 25 @ 04:54          9.2   22.48 >----< 443          29.7    Chemistry: 25 @ 04:54  135|97|33    ------------< 168  3.5|28|1.57    Ca: 8.6 25 @ 04:54  M.70 25 @ 04:54  Phos: 2.6 25 @ 04:54    LFT's: 25 @ 04:54  AST: --  ALT: --  ALP: --  T.Bili: --  DGiselaBili: --    __________________________________________________________________   ===================>> ASSESSMENT AND PLAN <<======================  -----------------------------------------------------------------------------------------------------------  A:  DOMINGO CAPELLAN is a 75y Male s/p  P:  - Abx: PIP/Tazo 3.375g IV q12h  - Pain control: APAP 1000mg IV q6h, OXY 2.5mg PO q4h PRN mod pain (4-6), OXY 5mg PO q4h PRN severe pain (7-10)  - Diet: Low Fiber   - Garnett: In place   - Activity: Ambulate ad monroe, OOB to chair 1-4h post PACU unless somnolent/hypotensive  - DVT ppx: Heparin 5000 units SC q8h, SCDs    A Team  [id number]  __________________________________________________________________   ===================>> SURGERY PROGRESS NOTE <<===================  -----------------------------------------------------------------------------------------------------------  Interval/Subjective:  Reports some nausea yesterday but no emesis. Had very little to eat for dinner. Still passing gas and having bowel movements. Pain under control.   __________________________________________________________________   ===================>> VITAL SIGNS / EXAM <<=========================  -----------------------------------------------------------------------------------------------------------  T(C): 36.6 (04:54), Max: 36.9 (17:36)  HR: 94 (04:54) (82 - 95)  BP: 139/75 (04:54) (128/69 - 151/84)  RR: 18 (04:54) (17 - 18)  SpO2: 96% (04:54) (96% - 100%)    I & O's:  IN:    dextrose 5% + sodium chloride 0.45% w/ Additives: 950 mL    IV PiggyBack: 200 mL    Oral Fluid: 580 mL  Total IN: 1730 mL    OUT:    Stool (mL): 1 mL    Voided (mL): 1000 mL  Total OUT: 1001 mL    Physical Exam:  General: NAD, resting comfortably in bed  HEENT: Normocephalic atraumatic  Respiratory: Nonlabored respirations  Cardio: regular rate, normotensive  Abdomen: Soft, slightly distended, nontender, staples on lower midline laparotomy intact, some SS drainage from incision  __________________________________________________________________   ===================>> LAB AND IMAGING <<==========================  -----------------------------------------------------------------------------------------------------------  CBC: 25 @ 04:17          10.4   22.32 >----< 504          34.5    Chemistry: 25 @ 04:17  133|94|32    ------------< 175  3.5|26|1.52    Ca: 8.7 25 @ 04:17  M.70 25 @ 04:17  Phos: 3.0 25 @ 04:17    LFT's: 25 @ 04:17  AST: --  ALT: --  ALP: --  Cristy: --  Fransico: --    __________________________________________________________________   ===================>> ASSESSMENT AND PLAN <<======================  -----------------------------------------------------------------------------------------------------------  A:  75M w/ PMHx CAD s/p CABG, HTN, HLD, partial sz; PSHx bilat femoral hernia repair, R inguinal hernia repair; POD5 s/p R hemicolectomy & ileo-transverse anastomosis (2025) for 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   P:  - Abx: PIP/Tazo 3.375g IV q12h  - Pain control: APAP 1000mg IV q6h, OXY 2.5mg PO q4h PRN mod pain (4-6), OXY 5mg PO q4h PRN severe pain (7-10)  - Diet: Low Fiber   - Activity: Ambulate as tolerated  - DVT ppx: Heparin 5000 units SC q8h, SCDs    A Team  [id number]

## 2025-04-09 NOTE — DISCHARGE NOTE NURSING/CASE MANAGEMENT/SOCIAL WORK - FINANCIAL ASSISTANCE
[facility] provides services at a reduced cost to those who are determined to be eligible through [facility]’s financial assistance program. Information regarding [facility]’s financial assistance program can be found by going to [email or web address] or by calling [phone].

## 2025-04-09 NOTE — DISCHARGE NOTE NURSING/CASE MANAGEMENT/SOCIAL WORK - PATIENT PORTAL LINK FT
You can access the FollowMyHealth Patient Portal offered by [facility] by registering at the following website: [email or web address]. By joining [facility]’s FollowMyHealth portal, you will also be able to view your health information using other applications (apps) compatible with our system.
no

## 2025-04-09 NOTE — DISCHARGE NOTE NURSING/CASE MANAGEMENT/SOCIAL WORK - NSDCPEFALRISK_GEN_ALL_CORE
For information on Fall & Injury Prevention, visit: [email or web address] OR  [email or web address] OR  https://www.cdc.gov/steadi/patient.html

## 2025-04-09 NOTE — DISCHARGE NOTE NURSING/CASE MANAGEMENT/SOCIAL WORK - HAS THE PATIENT RECEIVED THE INFLUENZA VACCINE THIS SEASON?
yes... Ketoconazole Pregnancy And Lactation Text: This medication is Pregnancy Category C and it isn't know if it is safe during pregnancy. It is also excreted in breast milk and breast feeding isn't recommended.

## 2025-04-10 ENCOUNTER — NON-APPOINTMENT (OUTPATIENT)
Age: 75
End: 2025-04-10

## 2025-04-10 VITALS
TEMPERATURE: 99 F | SYSTOLIC BLOOD PRESSURE: 149 MMHG | DIASTOLIC BLOOD PRESSURE: 66 MMHG | RESPIRATION RATE: 19 BRPM | OXYGEN SATURATION: 96 % | HEART RATE: 94 BPM

## 2025-04-10 LAB
ADD ON TEST-SPECIMEN IN LAB: SIGNIFICANT CHANGE UP
ADD ON TEST-SPECIMEN IN LAB: SIGNIFICANT CHANGE UP
ANION GAP SERPL CALC-SCNC: 16 MMOL/L — HIGH (ref 7–14)
ANISOCYTOSIS BLD QL: SIGNIFICANT CHANGE UP
APPEARANCE UR: CLEAR — SIGNIFICANT CHANGE UP
BILIRUB UR-MCNC: NEGATIVE — SIGNIFICANT CHANGE UP
BUN SERPL-MCNC: 30 MG/DL — HIGH (ref 7–23)
BURR CELLS BLD QL SMEAR: PRESENT — SIGNIFICANT CHANGE UP
BURR CELLS BLD QL SMEAR: SLIGHT — SIGNIFICANT CHANGE UP
CALCIUM SERPL-MCNC: 9.4 MG/DL — SIGNIFICANT CHANGE UP (ref 8.4–10.5)
CHLORIDE SERPL-SCNC: 91 MMOL/L — LOW (ref 98–107)
CO2 SERPL-SCNC: 26 MMOL/L — SIGNIFICANT CHANGE UP (ref 22–31)
COLOR SPEC: YELLOW — SIGNIFICANT CHANGE UP
CREAT SERPL-MCNC: 1.34 MG/DL — HIGH (ref 0.5–1.3)
DIFF PNL FLD: NEGATIVE — SIGNIFICANT CHANGE UP
EGFR: 55 ML/MIN/1.73M2 — LOW
EGFR: 55 ML/MIN/1.73M2 — LOW
ELLIPTOCYTES BLD QL SMEAR: SIGNIFICANT CHANGE UP
GIANT PLATELETS BLD QL SMEAR: PRESENT — SIGNIFICANT CHANGE UP
GLUCOSE SERPL-MCNC: 159 MG/DL — HIGH (ref 70–99)
GLUCOSE UR QL: NEGATIVE MG/DL — SIGNIFICANT CHANGE UP
HCT VFR BLD CALC: 32.5 % — LOW (ref 39–50)
HGB BLD-MCNC: 10 G/DL — LOW (ref 13–17)
HYPOCHROMIA BLD QL: SLIGHT — SIGNIFICANT CHANGE UP
KETONES UR-MCNC: NEGATIVE MG/DL — SIGNIFICANT CHANGE UP
LEUKOCYTE ESTERASE UR-ACNC: NEGATIVE — SIGNIFICANT CHANGE UP
MACROCYTES BLD QL: SIGNIFICANT CHANGE UP
MAGNESIUM SERPL-MCNC: 1.6 MG/DL — SIGNIFICANT CHANGE UP (ref 1.6–2.6)
MCHC RBC-ENTMCNC: 23.6 PG — LOW (ref 27–34)
MCHC RBC-ENTMCNC: 30.8 G/DL — LOW (ref 32–36)
MCV RBC AUTO: 76.7 FL — LOW (ref 80–100)
METAMYELOCYTES # FLD: 1.7 % — HIGH (ref 0–1)
METAMYELOCYTES NFR BLD: 1.7 % — HIGH (ref 0–1)
MYELOCYTES NFR BLD: 3.5 % — HIGH (ref 0–0)
NITRITE UR-MCNC: NEGATIVE — SIGNIFICANT CHANGE UP
NRBC # BLD AUTO: 0.55 K/UL — HIGH (ref 0–0)
NRBC # BLD: 2 /100 WBCS — HIGH (ref 0–0)
NRBC # FLD: 0.55 K/UL — HIGH (ref 0–0)
NRBC BLD AUTO-RTO: 2 /100 WBCS — HIGH (ref 0–0)
NRBC BLD-RTO: 2 /100 WBCS — HIGH (ref 0–0)
OVALOCYTES BLD QL SMEAR: SLIGHT — SIGNIFICANT CHANGE UP
PH UR: 7 — SIGNIFICANT CHANGE UP (ref 5–8)
PHOSPHATE SERPL-MCNC: 2.4 MG/DL — LOW (ref 2.5–4.5)
PLAT MORPH BLD: NORMAL — SIGNIFICANT CHANGE UP
PLATELET # BLD AUTO: 611 K/UL — HIGH (ref 150–400)
PLATELET COUNT - ESTIMATE: ABNORMAL
POIKILOCYTOSIS BLD QL AUTO: SIGNIFICANT CHANGE UP
POLYCHROMASIA BLD QL SMEAR: SLIGHT — SIGNIFICANT CHANGE UP
POTASSIUM SERPL-MCNC: 3.2 MMOL/L — LOW (ref 3.5–5.3)
POTASSIUM SERPL-SCNC: 3.2 MMOL/L — LOW (ref 3.5–5.3)
PROT UR-MCNC: SIGNIFICANT CHANGE UP MG/DL
RBC # BLD: 4.24 M/UL — SIGNIFICANT CHANGE UP (ref 4.2–5.8)
RBC # FLD: 17.5 % — HIGH (ref 10.3–14.5)
RBC BLD AUTO: ABNORMAL
SCHISTOCYTES BLD QL AUTO: SLIGHT — SIGNIFICANT CHANGE UP
SMUDGE CELLS # BLD: PRESENT — SIGNIFICANT CHANGE UP
SODIUM SERPL-SCNC: 133 MMOL/L — LOW (ref 135–145)
SP GR SPEC: 1.02 — SIGNIFICANT CHANGE UP (ref 1–1.03)
UROBILINOGEN FLD QL: 0.2 MG/DL — SIGNIFICANT CHANGE UP (ref 0.2–1)
WBC # BLD: 29.55 K/UL — HIGH (ref 3.8–10.5)
WBC # FLD AUTO: 29.55 K/UL — HIGH (ref 3.8–10.5)

## 2025-04-10 PROCEDURE — 71045 X-RAY EXAM CHEST 1 VIEW: CPT | Mod: 26

## 2025-04-10 RX ORDER — ACETAMINOPHEN 500 MG/5ML
2 LIQUID (ML) ORAL
Qty: 0 | Refills: 0 | DISCHARGE
Start: 2025-04-10

## 2025-04-10 RX ORDER — MAGNESIUM SULFATE 500 MG/ML
2 SYRINGE (ML) INJECTION ONCE
Refills: 0 | Status: COMPLETED | OUTPATIENT
Start: 2025-04-10 | End: 2025-04-10

## 2025-04-10 RX ORDER — SOD PHOS DI, MONO/K PHOS MONO 250 MG
2 TABLET ORAL ONCE
Refills: 0 | Status: COMPLETED | OUTPATIENT
Start: 2025-04-10 | End: 2025-04-10

## 2025-04-10 RX ORDER — APIXABAN 2.5 MG/1
1 TABLET, FILM COATED ORAL
Qty: 0 | Refills: 0 | DISCHARGE

## 2025-04-10 RX ORDER — OXYCODONE HYDROCHLORIDE 30 MG/1
1 TABLET ORAL
Qty: 0 | Refills: 0 | DISCHARGE
Start: 2025-04-10

## 2025-04-10 RX ADMIN — HEPARIN SODIUM 5000 UNIT(S): 1000 INJECTION INTRAVENOUS; SUBCUTANEOUS at 05:23

## 2025-04-10 RX ADMIN — Medication 40 MILLIEQUIVALENT(S): at 12:00

## 2025-04-10 RX ADMIN — Medication 1000 MILLIGRAM(S): at 07:30

## 2025-04-10 RX ADMIN — Medication 25 GRAM(S): at 12:00

## 2025-04-10 RX ADMIN — Medication 1000 MILLIGRAM(S): at 01:19

## 2025-04-10 RX ADMIN — HEPARIN SODIUM 5000 UNIT(S): 1000 INJECTION INTRAVENOUS; SUBCUTANEOUS at 13:59

## 2025-04-10 RX ADMIN — Medication 1000 MILLIGRAM(S): at 13:59

## 2025-04-10 RX ADMIN — Medication 1000 MILLIGRAM(S): at 02:19

## 2025-04-10 RX ADMIN — Medication 2 TABLET(S): at 12:00

## 2025-04-10 RX ADMIN — Medication 1000 MILLIGRAM(S): at 07:01

## 2025-04-10 RX ADMIN — Medication 100 MILLIGRAM(S): at 14:00

## 2025-04-10 NOTE — PROGRESS NOTE ADULT - SUBJECTIVE AND OBJECTIVE BOX
A Team Colorectal Surgery Progress Note    S: Pt seen and examined with team on morning rounds. Patient feels well. Pain is controlled. Tolerating LFD without nause/emesis. +Flatus, +BM. +Voiding. No CP/Palpitations/SOB/HA/Dizziness.      Vital Signs Last 24 Hrs  T(C): 36.7 (10 Apr 2025 05:00), Max: 36.9 (10 Apr 2025 01:00)  T(F): 98 (10 Apr 2025 05:00), Max: 98.4 (10 Apr 2025 01:00)  HR: 102 (10 Apr 2025 05:00) (80 - 102)  BP: 159/73 (10 Apr 2025 05:00) (144/79 - 159/73)  BP(mean): --  RR: 18 (10 Apr 2025 05:00) (17 - 18)  SpO2: 96% (10 Apr 2025 05:00) (96% - 98%)    Parameters below as of 10 Apr 2025 05:00  Patient On (Oxygen Delivery Method): room air        I&O's Summary    09 Apr 2025 07:01  -  10 Apr 2025 07:00  --------------------------------------------------------  IN: 1110 mL / OUT: 1951 mL / NET: -841 mL      I&O's Detail    09 Apr 2025 07:01  -  10 Apr 2025 07:00  --------------------------------------------------------  IN:    IV PiggyBack: 250 mL    Oral Fluid: 860 mL  Total IN: 1110 mL    OUT:    Stool (mL): 1 mL    Voided (mL): 1950 mL  Total OUT: 1951 mL    Total NET: -841 mL          General Appearance: Appears well, NAD  Chest: Breathing comfortably on RA.    CV: S1, S2 @  Abdomen: Softly distended (slightly improved from yesterday), nontender, incision clean/dry/intact  Extremities: Moves all extremities.    LABS:                        10.0   29.55 )-----------( 611      ( 10 Apr 2025 05:13 )             32.5     04-10    133[L]  |  91[L]  |  30[H]  ----------------------------<  159[H]  3.2[L]   |  26  |  1.34[H]    Ca    9.4      10 Apr 2025 05:13  Phos  2.4     04-10  Mg     1.60     04-10        Urinalysis Basic - ( 10 Apr 2025 05:13 )    Color: x / Appearance: x / SG: x / pH: x  Gluc: 159 mg/dL / Ketone: x  / Bili: x / Urobili: x   Blood: x / Protein: x / Nitrite: x   Leuk Esterase: x / RBC: x / WBC x   Sq Epi: x / Non Sq Epi: x / Bacteria: x

## 2025-04-10 NOTE — PROGRESS NOTE ADULT - PROVIDER SPECIALTY LIST ADULT
Colorectal Surgery
Surgery

## 2025-04-10 NOTE — PROGRESS NOTE ADULT - ASSESSMENT
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    P:  - PT  - Abx: PIP/Tazo  - Pain control: APAP, OXY   - Diet: advancing to LRD  - IVF: D5 1/2NS @50/hr, if tolerating LRD will IVL  - Activity: OOB to chair    - DVT ppx: Heparin 5000 Units SubQ q8h    [facility]  #[id number]  
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    P:  - PT  - Garnett out, f/u TOV  - Abx: PIP/Tazo  - Pain control: APAP, OXY   - Diet: advancing to clears  - IVF: LR 40mL/hr  - Activity: OOB to chair after PACU  - DVT ppx: Heparin 5000 Units SubQ q8h    [facility]  #[id number]  
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   P:  - Caprini: 8   - Diet: Low Fiber Diet  - IVF: IVL  - Abx: PIP/Tazo 3.375g IV q12h  - Pain control: APAP 1000mg PO q6h, OXY 2.5mg PO q4h PRN mod pain (4-6), OXY 5mg PO q4h PRN severe pain (7-10)  - Activity: Ambulate as tolerated  - DVT ppx: Heparin 5000 units SC q8h, SCDs  - DISPO: Accepted to [facility] awaiting bed    A Team  [id number]

## 2025-04-12 ENCOUNTER — EMERGENCY (EMERGENCY)
Facility: HOSPITAL | Age: 75
LOS: 0 days | End: 2025-04-12
Attending: STUDENT IN AN ORGANIZED HEALTH CARE EDUCATION/TRAINING PROGRAM
Payer: MEDICARE

## 2025-04-12 VITALS — WEIGHT: 141.98 LBS

## 2025-04-12 DIAGNOSIS — Z95.1 PRESENCE OF AORTOCORONARY BYPASS GRAFT: Chronic | ICD-10-CM

## 2025-04-12 DIAGNOSIS — Z98.890 OTHER SPECIFIED POSTPROCEDURAL STATES: ICD-10-CM

## 2025-04-12 DIAGNOSIS — Z87.19 PERSONAL HISTORY OF OTHER DISEASES OF THE DIGESTIVE SYSTEM: ICD-10-CM

## 2025-04-12 DIAGNOSIS — I10 ESSENTIAL (PRIMARY) HYPERTENSION: ICD-10-CM

## 2025-04-12 DIAGNOSIS — R40.4 TRANSIENT ALTERATION OF AWARENESS: ICD-10-CM

## 2025-04-12 DIAGNOSIS — Z86.69 PERSONAL HISTORY OF OTHER DISEASES OF THE NERVOUS SYSTEM AND SENSE ORGANS: ICD-10-CM

## 2025-04-12 DIAGNOSIS — I46.9 CARDIAC ARREST, CAUSE UNSPECIFIED: ICD-10-CM

## 2025-04-12 DIAGNOSIS — K41.90 UNILATERAL FEMORAL HERNIA, WITHOUT OBSTRUCTION OR GANGRENE, NOT SPECIFIED AS RECURRENT: Chronic | ICD-10-CM

## 2025-04-12 DIAGNOSIS — Z90.49 ACQUIRED ABSENCE OF OTHER SPECIFIED PARTS OF DIGESTIVE TRACT: Chronic | ICD-10-CM

## 2025-04-12 DIAGNOSIS — E78.5 HYPERLIPIDEMIA, UNSPECIFIED: ICD-10-CM

## 2025-04-12 DIAGNOSIS — R11.10 VOMITING, UNSPECIFIED: ICD-10-CM

## 2025-04-12 DIAGNOSIS — I25.10 ATHEROSCLEROTIC HEART DISEASE OF NATIVE CORONARY ARTERY WITHOUT ANGINA PECTORIS: ICD-10-CM

## 2025-04-12 DIAGNOSIS — Z95.1 PRESENCE OF AORTOCORONARY BYPASS GRAFT: ICD-10-CM

## 2025-04-12 PROCEDURE — 92950 HEART/LUNG RESUSCITATION CPR: CPT

## 2025-04-12 PROCEDURE — 99285 EMERGENCY DEPT VISIT HI MDM: CPT | Mod: 25

## 2025-04-12 RX ORDER — DEXTROSE 50 % IN WATER 50 %
25 SYRINGE (ML) INTRAVENOUS ONCE
Refills: 0 | Status: COMPLETED | OUTPATIENT
Start: 2025-04-12 | End: 2025-04-12

## 2025-04-12 RX ORDER — NOREPINEPHRINE BITARTRATE 8 MG
0.05 SOLUTION INTRAVENOUS
Qty: 8 | Refills: 0 | Status: DISCONTINUED | OUTPATIENT
Start: 2025-04-12 | End: 2025-04-12

## 2025-04-12 RX ADMIN — Medication 10 UNIT(S): at 11:11

## 2025-04-12 RX ADMIN — Medication 25 MILLILITER(S): at 11:11

## 2025-04-12 RX ADMIN — NOREPINEPHRINE BITARTRATE 6.04 MICROGRAM(S)/KG/MIN: 8 SOLUTION at 10:55

## 2025-04-12 NOTE — ED PROCEDURE NOTE - CPROC ED TOLERANCE1
Patient tolerated procedure well. [Follow-Up Visit] : a follow-up visit for [FreeTextEntry2] : MGUS, neuropathy

## 2025-05-01 ENCOUNTER — NON-APPOINTMENT (OUTPATIENT)
Age: 75
End: 2025-05-01

## (undated) DEVICE — ELCTR BOVIE BLADE 3/4" EXTENDED LENGTH 6"

## (undated) DEVICE — ELCTR BOVIE PENCIL SMOKE EVACUATION

## (undated) DEVICE — PACK ABDOMINAL CLOSURE

## (undated) DEVICE — STAPLER SKIN PROXIMATE

## (undated) DEVICE — PACK MAJOR ABDOMINAL WITH LAP

## (undated) DEVICE — SUT SILK 3-0 18" SH (POP-OFF)

## (undated) DEVICE — PREP BETADINE KIT

## (undated) DEVICE — DRAPE 3/4 SHEET 52X76"

## (undated) DEVICE — WARMING BLANKET FULL ADULT

## (undated) DEVICE — POOLE SUCTION TIP

## (undated) DEVICE — ANESTHESIA CIRCUIT ADULT HMEF

## (undated) DEVICE — SUT PROLENE 2 60" TP-1

## (undated) DEVICE — STAPLER SKIN MULTI DIRECTION W35

## (undated) DEVICE — Device

## (undated) DEVICE — PACK PERI GYN

## (undated) DEVICE — PROTECTOR HEEL CONVOLUTED

## (undated) DEVICE — VENODYNE/SCD SLEEVE CALF MEDIUM

## (undated) DEVICE — ELCTR GROUNDING PAD ADULT COVIDIEN

## (undated) DEVICE — SUT VICRYL PLUS 2-0 18" TIES UNDYED

## (undated) DEVICE — DRAPE WARMING SOLUTION 44 X 44"

## (undated) DEVICE — SUT VICRYL PLUS 3-0 27" SH UNDYED

## (undated) DEVICE — FOLEY TRAY 16FR 5CC LF UMETER CLOSED

## (undated) DEVICE — POSITIONER FOAM EGG CRATE ULNAR 2PCS (PINK)

## (undated) DEVICE — LIGASURE IMPACT